# Patient Record
Sex: MALE | Race: WHITE | Employment: FULL TIME | URBAN - METROPOLITAN AREA
[De-identification: names, ages, dates, MRNs, and addresses within clinical notes are randomized per-mention and may not be internally consistent; named-entity substitution may affect disease eponyms.]

---

## 2022-03-08 ENCOUNTER — HOSPITAL ENCOUNTER (OUTPATIENT)
Dept: CT IMAGING | Age: 22
Discharge: HOME | End: 2022-03-10
Payer: COMMERCIAL

## 2022-03-08 ENCOUNTER — ANCILLARY ORDERS (OUTPATIENT)
Dept: PRIMARY CARE CLINIC | Age: 22
End: 2022-03-08

## 2022-03-08 ENCOUNTER — OFFICE VISIT (OUTPATIENT)
Dept: PRIMARY CARE CLINIC | Age: 22
End: 2022-03-08
Payer: COMMERCIAL

## 2022-03-08 VITALS
WEIGHT: 315 LBS | DIASTOLIC BLOOD PRESSURE: 84 MMHG | HEIGHT: 73 IN | HEART RATE: 104 BPM | OXYGEN SATURATION: 97 % | SYSTOLIC BLOOD PRESSURE: 128 MMHG | BODY MASS INDEX: 41.75 KG/M2

## 2022-03-08 DIAGNOSIS — E66.01 CLASS 3 SEVERE OBESITY WITHOUT SERIOUS COMORBIDITY WITH BODY MASS INDEX (BMI) OF 50.0 TO 59.9 IN ADULT, UNSPECIFIED OBESITY TYPE (HCC): ICD-10-CM

## 2022-03-08 DIAGNOSIS — R10.9 ABDOMINAL PAIN, UNSPECIFIED ABDOMINAL LOCATION: ICD-10-CM

## 2022-03-08 DIAGNOSIS — K76.0 FATTY LIVER: Primary | ICD-10-CM

## 2022-03-08 DIAGNOSIS — R10.12 LUQ PAIN: ICD-10-CM

## 2022-03-08 DIAGNOSIS — Z13.1 SCREENING FOR DIABETES MELLITUS: ICD-10-CM

## 2022-03-08 DIAGNOSIS — R10.12 LUQ PAIN: Primary | ICD-10-CM

## 2022-03-08 PROCEDURE — 99204 OFFICE O/P NEW MOD 45 MIN: CPT | Performed by: PHYSICIAN ASSISTANT

## 2022-03-08 PROCEDURE — 74176 CT ABD & PELVIS W/O CONTRAST: CPT

## 2022-03-08 PROCEDURE — 6360000004 HC RX CONTRAST MEDICATION: Performed by: PHYSICIAN ASSISTANT

## 2022-03-08 RX ORDER — SODIUM CHLORIDE 0.9 % (FLUSH) 0.9 %
10 SYRINGE (ML) INJECTION PRN
Status: DISCONTINUED | OUTPATIENT
Start: 2022-03-08 | End: 2022-03-08

## 2022-03-08 RX ORDER — 0.9 % SODIUM CHLORIDE 0.9 %
80 INTRAVENOUS SOLUTION INTRAVENOUS ONCE
Status: DISCONTINUED | OUTPATIENT
Start: 2022-03-08 | End: 2022-03-08

## 2022-03-08 RX ADMIN — IOHEXOL 50 ML: 240 INJECTION, SOLUTION INTRATHECAL; INTRAVASCULAR; INTRAVENOUS; ORAL at 15:49

## 2022-03-08 SDOH — ECONOMIC STABILITY: FOOD INSECURITY: WITHIN THE PAST 12 MONTHS, YOU WORRIED THAT YOUR FOOD WOULD RUN OUT BEFORE YOU GOT MONEY TO BUY MORE.: NEVER TRUE

## 2022-03-08 SDOH — ECONOMIC STABILITY: FOOD INSECURITY: WITHIN THE PAST 12 MONTHS, THE FOOD YOU BOUGHT JUST DIDN'T LAST AND YOU DIDN'T HAVE MONEY TO GET MORE.: NEVER TRUE

## 2022-03-08 ASSESSMENT — COLUMBIA-SUICIDE SEVERITY RATING SCALE - C-SSRS
2. HAVE YOU ACTUALLY HAD ANY THOUGHTS OF KILLING YOURSELF?: NO
6. HAVE YOU EVER DONE ANYTHING, STARTED TO DO ANYTHING, OR PREPARED TO DO ANYTHING TO END YOUR LIFE?: NO
1. WITHIN THE PAST MONTH, HAVE YOU WISHED YOU WERE DEAD OR WISHED YOU COULD GO TO SLEEP AND NOT WAKE UP?: NO

## 2022-03-08 ASSESSMENT — ENCOUNTER SYMPTOMS
PHOTOPHOBIA: 0
SORE THROAT: 0
RHINORRHEA: 0
VOMITING: 0
EYE DISCHARGE: 0
ABDOMINAL DISTENTION: 0
SINUS PRESSURE: 0
CONSTIPATION: 0
SHORTNESS OF BREATH: 0
CHEST TIGHTNESS: 0
COUGH: 0
DIARRHEA: 0
ABDOMINAL PAIN: 1

## 2022-03-08 ASSESSMENT — PATIENT HEALTH QUESTIONNAIRE - PHQ9
SUM OF ALL RESPONSES TO PHQ QUESTIONS 1-9: 0
SUM OF ALL RESPONSES TO PHQ9 QUESTIONS 1 & 2: 0
10. IF YOU CHECKED OFF ANY PROBLEMS, HOW DIFFICULT HAVE THESE PROBLEMS MADE IT FOR YOU TO DO YOUR WORK, TAKE CARE OF THINGS AT HOME, OR GET ALONG WITH OTHER PEOPLE: 0
1. LITTLE INTEREST OR PLEASURE IN DOING THINGS: 0
4. FEELING TIRED OR HAVING LITTLE ENERGY: 0
9. THOUGHTS THAT YOU WOULD BE BETTER OFF DEAD, OR OF HURTING YOURSELF: 0
SUM OF ALL RESPONSES TO PHQ QUESTIONS 1-9: 0
SUM OF ALL RESPONSES TO PHQ QUESTIONS 1-9: 0
2. FEELING DOWN, DEPRESSED OR HOPELESS: 0
6. FEELING BAD ABOUT YOURSELF - OR THAT YOU ARE A FAILURE OR HAVE LET YOURSELF OR YOUR FAMILY DOWN: 0
SUM OF ALL RESPONSES TO PHQ QUESTIONS 1-9: 0
7. TROUBLE CONCENTRATING ON THINGS, SUCH AS READING THE NEWSPAPER OR WATCHING TELEVISION: 0
3. TROUBLE FALLING OR STAYING ASLEEP: 0
5. POOR APPETITE OR OVEREATING: 0
8. MOVING OR SPEAKING SO SLOWLY THAT OTHER PEOPLE COULD HAVE NOTICED. OR THE OPPOSITE, BEING SO FIGETY OR RESTLESS THAT YOU HAVE BEEN MOVING AROUND A LOT MORE THAN USUAL: 0

## 2022-03-08 ASSESSMENT — SOCIAL DETERMINANTS OF HEALTH (SDOH): HOW HARD IS IT FOR YOU TO PAY FOR THE VERY BASICS LIKE FOOD, HOUSING, MEDICAL CARE, AND HEATING?: NOT HARD AT ALL

## 2022-03-08 NOTE — PROGRESS NOTES
[address] [place] [address] PRIMARY CARE  [address] [address]man [address] [address]  Dept: [phone]    Thyra Lundborg is a 20 y.o. male New patient, who presents today for his medical conditions/complaints as noted below. Chief Complaint   Patient presents with   [facility]    Abdominal Pain     LUQ abdominal pain about 4-5 days ago. No BM concerns        HPI:     HPI: The patient is here for LUQ bd pain. 4-5 days ago. It was tender then. Has happened 3-5 years ago. Did blood tests only found elevated liver enzymes. LUQ pain has gotten better. Moving makes it worse. Gets nauseated with the pain. No fevers and no change in bowel habits. Has not tried anything yet. Reviewed prior notes None  Reviewed previous Labs    No results found for: LDLCHOLESTEROL, LDLCALC    (goal LDL is <100)   No results found for: AST, ALT, BUN, LABA1C, TSH  BP Readings from Last 3 Encounters:   03/08/22 128/84          (goal 120/80)    Past Medical History:   Diagnosis Date    ADHD (attention deficit hyperactivity disorder)     Depressed       Past Surgical History:   Procedure Laterality Date    TONSILLECTOMY AND ADENOIDECTOMY         History reviewed. No pertinent family history. Social History     Tobacco Use    Smoking status: Light Tobacco Smoker     Packs/day: 0.25     Types: Cigarettes     Start date: 2013    Smokeless tobacco: Current User     Types: Chew   Substance Use Topics    Alcohol use: Yes     Comment: social      No current outpatient medications on file. No current facility-administered medications for this visit.      No Known Allergies    Health Maintenance   Topic Date Due    Hepatitis C screen  Never done    Varicella vaccine (1 of 2 - 2-dose childhood series) Never done    COVID-19 Vaccine (1) Never done    HPV vaccine (1 - Male 2-dose series) Never done    Depression Screen  Never done    HIV screen  Never done    DTaP/Tdap/Td vaccine (1 - Tdap) Never done  Flu vaccine (1) Never done    Hepatitis A vaccine  Aged Out    Hepatitis B vaccine  Aged Out    Hib vaccine  Aged Out    Meningococcal (ACWY) vaccine  Aged Out    Pneumococcal 0-64 years Vaccine  Aged Out       Subjective:      Review of Systems   Constitutional: Negative for chills, fever and unexpected weight change. HENT: Negative for congestion, hearing loss, rhinorrhea, sinus pressure and sore throat. Eyes: Negative for photophobia, discharge and visual disturbance. Respiratory: Negative for cough, chest tightness and shortness of breath. Cardiovascular: Negative for chest pain, palpitations and leg swelling. Gastrointestinal: Positive for abdominal pain (LUQ). Negative for abdominal distention, constipation, diarrhea and vomiting. Endocrine: Negative for polydipsia and polyuria. Genitourinary: Negative for decreased urine volume, difficulty urinating, frequency and urgency. Musculoskeletal: Negative for arthralgias, gait problem and myalgias. Skin: Negative for rash. Allergic/Immunologic: Negative for food allergies. Neurological: Negative for dizziness, weakness, numbness and headaches. Hematological: Negative for adenopathy. Psychiatric/Behavioral: Positive for dysphoric mood. Negative for sleep disturbance. The patient is nervous/anxious (smokes and that helps). Objective:     /84   Pulse 104   Ht 6' 1\" (1.854 m)   Wt (!) 381 lb 9.6 oz (173.1 kg)   SpO2 97%   BMI 50.35 kg/m²   Physical Exam  Constitutional:       General: He is not in acute distress. Appearance: Normal appearance. He is not ill-appearing. HENT:      Head: Normocephalic and atraumatic. Right Ear: External ear normal.      Left Ear: External ear normal.      Nose: Nose normal.      Mouth/Throat:      Mouth: Mucous membranes are moist.   Eyes:      Extraocular Movements: Extraocular movements intact.       Conjunctiva/sclera: Conjunctivae normal.      Pupils: Pupils are equal, round, and reactive to light. Neck:      Vascular: No carotid bruit. Cardiovascular:      Rate and Rhythm: Normal rate and regular rhythm. Pulses: Normal pulses. Heart sounds: Normal heart sounds. Pulmonary:      Effort: Pulmonary effort is normal. No respiratory distress. Breath sounds: Normal breath sounds. Abdominal:      General: Bowel sounds are normal. There is no distension. Tenderness: There is abdominal tenderness (diffuse). There is guarding. Musculoskeletal:         General: Normal range of motion. Cervical back: Normal range of motion and neck supple. Lymphadenopathy:      Cervical: No cervical adenopathy. Skin:     General: Skin is warm and dry. Neurological:      General: No focal deficit present. Mental Status: He is alert and oriented to person, place, and time. Psychiatric:         Mood and Affect: Mood normal.         Behavior: Behavior normal.         Thought Content: Thought content normal.         Assessment and Plan:          1. LUQ pain  -     US ABDOMEN LIMITED; Future  -     CT ABDOMEN PELVIS W IV CONTRAST Additional Contrast? Radiologist Recommendation; Future  2. Abdominal pain, unspecified abdominal location  -     US ABDOMEN LIMITED; Future  -     CT ABDOMEN PELVIS W IV CONTRAST Additional Contrast? Radiologist Recommendation; Future  3. Screening for diabetes mellitus  -     CBC with Auto Differential; Future  -     Comprehensive Metabolic Panel; Future  -     Lipid, Fasting; Future  -     Hemoglobin A1C; Future  -     TSH With Reflex Ft4; Future  4. Class 3 severe obesity without serious comorbidity with body mass index (BMI) of 50.0 to 59.9 in adult, unspecified obesity type ([address]  -     CT ABDOMEN PELVIS W IV CONTRAST Additional Contrast? Radiologist Recommendation; Future             Patient given educational materials - see patient instructions. Discussed use, benefit, and side effects of prescribed medications.   All patient questions answered. Pt voiced understanding. Reviewed health maintenance. Instructed to continue current medications, diet and exercise. Patient agreed with treatment plan. Follow up as directed.      Electronically signed by DIGNA Ruiz on 3/8/2022 at 8:51 AM

## 2022-03-09 ENCOUNTER — TELEPHONE (OUTPATIENT)
Dept: PRIMARY CARE CLINIC | Age: 22
End: 2022-03-09

## 2022-03-09 NOTE — TELEPHONE ENCOUNTER
----- Message from Shania Olivo sent at 3/9/2022  8:07 AM EST -----  Subject: Results Request    QUESTIONS  Which lab or imaging result is the patient calling about? CT Scan Abdomen  Which provider ordered the test?   At what location was the test performed? Date the test was performed? 2022-03-08  Additional Information for Provider?   ---------------------------------------------------------------------------  --------------  CALL BACK INFO  What is the best way for the office to contact you? OK to leave message on   voicemail  Preferred Call Back Phone Number?  [phone]

## 2022-03-09 NOTE — RESULT ENCOUNTER NOTE
Call and advise patient that the results showed no obvious cause of pain. Hepatomegaly, splenomegaly, diverticulosis, and degenerative changes of spine were noted.

## 2022-03-16 ENCOUNTER — TELEPHONE (OUTPATIENT)
Dept: GASTROENTEROLOGY | Age: 22
End: 2022-03-16

## 2022-03-16 NOTE — TELEPHONE ENCOUNTER
NP / LUQ pain - Abdominal pain, unspecified abdominal location (ICD-10-CM) - Class 3 severe obesity without serious comorbidity with body mass index (BMI) of 50.0 to 59.9 in adult, unspecified obesity type ([facility] [address] - Fatty liver / bcbs

## 2022-04-05 NOTE — TELEPHONE ENCOUNTER
Called patient to schedule and mailbox is still full. Third attempt and sending back to the referring provider.

## 2022-07-06 ENCOUNTER — HOSPITAL ENCOUNTER (EMERGENCY)
Age: 22
Discharge: HOME | End: 2022-07-06
Attending: EMERGENCY MEDICINE
Payer: COMMERCIAL

## 2022-07-06 ENCOUNTER — APPOINTMENT (OUTPATIENT)
Dept: ULTRASOUND IMAGING | Age: 22
End: 2022-07-06
Payer: COMMERCIAL

## 2022-07-06 VITALS
OXYGEN SATURATION: 99 % | TEMPERATURE: 98 F | WEIGHT: 315 LBS | HEART RATE: 87 BPM | RESPIRATION RATE: 16 BRPM | HEIGHT: 73 IN | SYSTOLIC BLOOD PRESSURE: 157 MMHG | DIASTOLIC BLOOD PRESSURE: 89 MMHG | BODY MASS INDEX: 41.75 KG/M2

## 2022-07-06 DIAGNOSIS — R10.11 RIGHT UPPER QUADRANT ABDOMINAL PAIN: Primary | ICD-10-CM

## 2022-07-06 LAB
ABSOLUTE EOS #: 0.4 K/UL (ref 0–0.4)
ABSOLUTE LYMPH #: 3.3 K/UL (ref 1–4.8)
ABSOLUTE MONO #: 0.9 K/UL (ref 0.1–1.2)
ALBUMIN SERPL-MCNC: 5 G/DL (ref 3.5–5.2)
ALBUMIN/GLOBULIN RATIO: 1.6 (ref 1–2.5)
ALP BLD-CCNC: 94 U/L (ref 40–129)
ALT SERPL-CCNC: 77 U/L (ref 5–41)
ANION GAP SERPL CALCULATED.3IONS-SCNC: 12 MMOL/L (ref 9–17)
AST SERPL-CCNC: 38 U/L
BASOPHILS # BLD: 1 % (ref 0–2)
BASOPHILS ABSOLUTE: 0.1 K/UL (ref 0–0.2)
BILIRUB SERPL-MCNC: 0.81 MG/DL (ref 0.3–1.2)
BILIRUBIN DIRECT: 0.15 MG/DL
BILIRUBIN, INDIRECT: 0.66 MG/DL (ref 0–1)
BUN BLDV-MCNC: 9 MG/DL (ref 6–20)
CALCIUM SERPL-MCNC: 10.1 MG/DL (ref 8.6–10.4)
CHLORIDE BLD-SCNC: 101 MMOL/L (ref 98–107)
CO2: 26 MMOL/L (ref 20–31)
CREAT SERPL-MCNC: 0.63 MG/DL (ref 0.7–1.2)
EOSINOPHILS RELATIVE PERCENT: 3 % (ref 1–4)
GFR AFRICAN AMERICAN: >60 ML/MIN
GFR NON-AFRICAN AMERICAN: >60 ML/MIN
GFR SERPL CREATININE-BSD FRML MDRD: ABNORMAL ML/MIN/{1.73_M2}
GLUCOSE BLD-MCNC: 95 MG/DL (ref 70–99)
HCT VFR BLD CALC: 45.2 % (ref 41–53)
HEMOGLOBIN: 15.5 G/DL (ref 13.5–17.5)
LIPASE: 29 U/L (ref 13–60)
LYMPHOCYTES # BLD: 24 % (ref 24–44)
MCH RBC QN AUTO: 26.8 PG (ref 26–34)
MCHC RBC AUTO-ENTMCNC: 34.4 G/DL (ref 31–37)
MCV RBC AUTO: 77.9 FL (ref 80–100)
MONOCYTES # BLD: 7 % (ref 2–11)
PDW BLD-RTO: 14.2 % (ref 12.5–15.4)
PLATELET # BLD: 305 K/UL (ref 140–450)
PMV BLD AUTO: 8.1 FL (ref 6–12)
POTASSIUM SERPL-SCNC: 4.4 MMOL/L (ref 3.7–5.3)
RBC # BLD: 5.8 M/UL (ref 4.5–5.9)
SEG NEUTROPHILS: 65 % (ref 36–66)
SEGMENTED NEUTROPHILS ABSOLUTE COUNT: 9.1 K/UL (ref 1.8–7.7)
SODIUM BLD-SCNC: 139 MMOL/L (ref 135–144)
TOTAL PROTEIN: 8.1 G/DL (ref 6.4–8.3)
WBC # BLD: 13.9 K/UL (ref 3.5–11)

## 2022-07-06 PROCEDURE — 80048 BASIC METABOLIC PNL TOTAL CA: CPT

## 2022-07-06 PROCEDURE — 96374 THER/PROPH/DIAG INJ IV PUSH: CPT

## 2022-07-06 PROCEDURE — 2580000003 HC RX 258: Performed by: EMERGENCY MEDICINE

## 2022-07-06 PROCEDURE — 85025 COMPLETE CBC W/AUTO DIFF WBC: CPT

## 2022-07-06 PROCEDURE — 6360000002 HC RX W HCPCS: Performed by: EMERGENCY MEDICINE

## 2022-07-06 PROCEDURE — 80076 HEPATIC FUNCTION PANEL: CPT

## 2022-07-06 PROCEDURE — 83690 ASSAY OF LIPASE: CPT

## 2022-07-06 PROCEDURE — 99284 EMERGENCY DEPT VISIT MOD MDM: CPT

## 2022-07-06 PROCEDURE — 36415 COLL VENOUS BLD VENIPUNCTURE: CPT

## 2022-07-06 PROCEDURE — 76705 ECHO EXAM OF ABDOMEN: CPT

## 2022-07-06 RX ORDER — 0.9 % SODIUM CHLORIDE 0.9 %
1000 INTRAVENOUS SOLUTION INTRAVENOUS ONCE
Status: COMPLETED | OUTPATIENT
Start: 2022-07-06 | End: 2022-07-06

## 2022-07-06 RX ORDER — HYDROCODONE BITARTRATE AND ACETAMINOPHEN 5; 325 MG/1; MG/1
1 TABLET ORAL EVERY 6 HOURS PRN
Qty: 10 TABLET | Refills: 0 | Status: SHIPPED | OUTPATIENT
Start: 2022-07-06 | End: 2022-07-09

## 2022-07-06 RX ORDER — IBUPROFEN 600 MG/1
600 TABLET ORAL EVERY 6 HOURS PRN
Qty: 30 TABLET | Refills: 0 | Status: ON HOLD | OUTPATIENT
Start: 2022-07-06 | End: 2022-07-19 | Stop reason: ALTCHOICE

## 2022-07-06 RX ORDER — KETOROLAC TROMETHAMINE 15 MG/ML
15 INJECTION, SOLUTION INTRAMUSCULAR; INTRAVENOUS ONCE
Status: COMPLETED | OUTPATIENT
Start: 2022-07-06 | End: 2022-07-06

## 2022-07-06 RX ORDER — ESOMEPRAZOLE MAGNESIUM 40 MG/1
40 CAPSULE, DELAYED RELEASE ORAL
Qty: 30 CAPSULE | Refills: 0 | Status: ON HOLD | OUTPATIENT
Start: 2022-07-06 | End: 2022-07-19 | Stop reason: ALTCHOICE

## 2022-07-06 RX ADMIN — KETOROLAC TROMETHAMINE 15 MG: 15 INJECTION, SOLUTION INTRAMUSCULAR; INTRAVENOUS at 14:04

## 2022-07-06 RX ADMIN — SODIUM CHLORIDE 1000 ML: 9 INJECTION, SOLUTION INTRAVENOUS at 13:53

## 2022-07-06 ASSESSMENT — PAIN DESCRIPTION - DESCRIPTORS: DESCRIPTORS: SHARP;STABBING

## 2022-07-06 ASSESSMENT — ENCOUNTER SYMPTOMS: ABDOMINAL PAIN: 1

## 2022-07-06 ASSESSMENT — PAIN DESCRIPTION - ORIENTATION: ORIENTATION: RIGHT;UPPER

## 2022-07-06 ASSESSMENT — PAIN SCALES - GENERAL
PAINLEVEL_OUTOF10: 5
PAINLEVEL_OUTOF10: 3

## 2022-07-06 ASSESSMENT — PAIN DESCRIPTION - LOCATION: LOCATION: ABDOMEN

## 2022-07-06 ASSESSMENT — PAIN DESCRIPTION - PAIN TYPE: TYPE: ACUTE PAIN

## 2022-07-06 ASSESSMENT — PAIN DESCRIPTION - FREQUENCY: FREQUENCY: CONTINUOUS

## 2022-07-06 NOTE — ED PROVIDER NOTES
[address] [facility]  [address] [facility] [address] [facility] [address]  Phone: [phone]  Fax: [phone]        Pt Name: Prashant Merchant  MRN: [id number]  [place] 2000  Date of evaluation: 22      CHIEF COMPLAINT     Chief Complaint   Patient presents with    Abdominal Pain     RUQ woke him at 0200         HISTORY OF PRESENT ILLNESS  (Location/Symptom, Timing/Onset, Context/Setting, Quality, Duration, Modifying Factors, Severity.)    Prashant Merchant is a 20 y.o. male who presents with abdominal pain. The patient states that for 4 years he has had intermittent episodes of right upper quadrant abdominal pain he states his family doctor did get an MRI of his abdomen and suggested he had gallbladder issues however he has never followed up with a surgeon no fever no chills no nausea vomiting or diarrhea no blood in the urine or stool no chest pain no shortness of breath nothing he does makes his symptoms better or worse the patient dates they have been intermittent for the last 4 years and this episode started last night after eating rice      REVIEW OF SYSTEMS    (2-9 systems for level 4, 10 or more for level 5)     Review of Systems   Gastrointestinal: Positive for abdominal pain. All other systems reviewed and are negative. PAST MEDICAL HISTORY    has a past medical history of ADHD (attention deficit hyperactivity disorder) and Depressed. SURGICAL HISTORY      has a past surgical history that includes Tonsillectomy and adenoidectomy. CURRENTMEDICATIONS       Previous Medications    No medications on file       ALLERGIES     has No Known Allergies. FAMILY HISTORY     He indicated that his mother is alive. He indicated that his father is . family history is not on file. SOCIAL HISTORY      reports that he has been smoking cigarettes and e-cigarettes. He started smoking about 9 years ago. He has been smoking about 0.25 packs per day.  He has quit using smokeless tobacco.  His smokeless tobacco use included chew. He reports previous alcohol use. He reports previous drug use. Drug: Marijuana [place]). PHYSICAL EXAM    (up to 7 for level 4, 8 or more for level 5)   INITIAL VITALS:  height is 6' 1\" (1.854 m) and weight is 174.6 kg (385 lb) (abnormal). His oral temperature is 98 °F (36.7 °C). His blood pressure is 157/89 (abnormal) and his pulse is 87. His respiration is 16 and oxygen saturation is 99%. Physical Exam  Vitals and nursing note reviewed. Constitutional:       Appearance: Normal appearance. HENT:      Head: Normocephalic and atraumatic. Eyes:      Conjunctiva/sclera: Conjunctivae normal.   Cardiovascular:      Rate and Rhythm: Normal rate and regular rhythm. Pulmonary:      Effort: Pulmonary effort is normal.      Breath sounds: Normal breath sounds. Abdominal:      General: Bowel sounds are normal. There is no distension. Palpations: Abdomen is soft. There is no mass. Tenderness: There is abdominal tenderness. There is guarding. There is no right CVA tenderness, left CVA tenderness or rebound. Comments: Tenderness with some voluntary guarding in the right upper quadrant only no McBurney's area tenderness no other abdominal tenderness   Musculoskeletal:         General: Normal range of motion. Cervical back: Normal range of motion and neck supple. Skin:     General: Skin is warm and dry. Neurological:      General: No focal deficit present. Mental Status: He is alert.       Comments: GCS of 15 with no focal deficits appreciated         DIFFERENTIAL DIAGNOSIS/ MDM:     We will establish an IV give the patient IV fluids and Toradol I will check labs UA and an ultrasound of the gallbladder    DIAGNOSTIC RESULTS         RADIOLOGY:    EXAMINATION:   CT OF THE ABDOMEN AND PELVIS WITHOUT CONTRAST 3/8/2022 11:40 am       TECHNIQUE:   CT of the abdomen and pelvis was performed without the administration of   intravenous contrast. Multiplanar reformatted images are provided for review. Dose modulation, iterative reconstruction, and/or weight based adjustment of   the mA/kV was utilized to reduce the radiation dose to as low as reasonably   achievable.       COMPARISON:   None.       HISTORY:   ORDERING SYSTEM PROVIDED HISTORY: LUQ pain   TECHNOLOGIST PROVIDED HISTORY:       Reason for Exam: LUQ abdominal pain occasionally RUQ as well. x 4-5 days   Additional signs and symptoms: sts had this before 3-5 years ago.       FINDINGS:   Examination was performed without intravenous contrast due to patient   refusal. Ama Sack did agree to enteric contrast administration.       Lower Chest: Lung bases are clear.  No pleural or pericardial effusion.       Organs: Mild hepatomegaly spanning just over 19 cm craniocaudal with marked   steatosis.  Regions of focal fatty sparing near the gallbladder fossa.  Mild   splenomegaly spanning just over 16 cm craniocaudal.  Incidental small   splenule along the inferolateral splenic margin.  Unenhanced pancreas and   adrenal glands are grossly unremarkable.  No nephrolithiasis, hydronephrosis,   or perinephric stranding.  Ureters are normal caliber.       GI/Bowel: Normal appendix.  No bowel obstruction.  Mild colonic   diverticulosis without diverticulitis.       Pelvis: Urinary bladder and male pelvic organs are grossly unremarkable.       Peritoneum/Retroperitoneum: No free fluid, free air, or lymphadenopathy. Normal caliber aorta.       Bones/Soft Tissues: No acute abnormality in the imaged portion of the soft   tissues.  Peripheral portions of the soft tissues are excluded from the field   of view due to body habitus, more significantly on the right.  Degenerative   changes and multilevel Schmorl's nodes in the lower thoracic spine.  Mild   lower lumbar degenerative changes.  Background baseline congenital spinal   canal stenosis.            Impression   Hepatomegaly with steatosis and regions of focal fatty sparing.       Mild splenomegaly.       Mild colonic diverticulosis without diverticulitis.       Normal appendix.       Mild degenerative changes of the spine superimposed on baseline congenital   spinal canal stenosis. Interpretation per the Radiologist below, if available at the time of this note:    [address] (Final result)  Result time 07/06/22 13:36:55  Final result by Iliana Alonzo MD (07/06/22 13:36:55)                Impression:    1. Fatty liver with focal fatty sparing.  Hepatomegaly. 2. Mildly dilated common bile duct measuring 6 mm.  Consider further   evaluation with outpatient MRCP.  Correlate with LFTs. 3. Nonvisualization of the pancreas. Narrative:    EXAMINATION:   RIGHT UPPER QUADRANT ULTRASOUND     7/6/2022 12:55 pm     COMPARISON:   CT abdomen pelvis from 03/08/2022     HISTORY:   ORDERING SYSTEM PROVIDED HISTORY: Pain   TECHNOLOGIST PROVIDED HISTORY:   Pain     59-year-old male with right upper quadrant abdominal ultrasound     FINDINGS:   Exam is limited as patient was unable to handle for old fracture as well as   patient's body habitus. LIVER: Diffuse fatty infiltration of the liver with focal fatty sparing.  No   intrahepatic biliary ductal dilation.  Hepatomegaly.  Liver length measures   17.9 cm. BILIARY SYSTEM:  Gallbladder is unremarkable without evidence of   pericholecystic fluid, wall thickening or stones.  Negative sonographic   Nolan's sign.  Gallbladder wall thickness measures 3 mm. Common bile duct is mildly dilated measuring 6 mm. RIGHT KIDNEY: Right kidney measures 12.9 x 5.9 x 5.6 cm.  Right renal   cortical thickness measures 2.1 cm.  No gross right-sided hydronephrosis. PANCREAS:  Nonvisualization of the pancreas. OTHER: No evidence of right upper quadrant ascites.                    LABS:  Results for orders placed or performed during the hospital encounter of 01/49/11   Basic Metabolic Panel   Result Value Ref Range    Glucose 95 70 - 99 mg/dL    BUN 9 6 - 20 mg/dL    CREATININE 0.63 (L) 0.70 - 1.20 mg/dL    Calcium 10.1 8.6 - 10.4 mg/dL    Sodium 139 135 - 144 mmol/L    Potassium 4.4 3.7 - 5.3 mmol/L    Chloride 101 98 - 107 mmol/L    CO2 26 20 - 31 mmol/L    Anion Gap 12 9 - 17 mmol/L    GFR Non-African American >60 >60 mL/min    GFR African American >60 >60 mL/min    GFR Comment         CBC with Auto Differential   Result Value Ref Range    WBC 13.9 (H) 3.5 - 11.0 k/uL    RBC 5.80 4.5 - 5.9 m/uL    Hemoglobin 15.5 13.5 - 17.5 g/dL    Hematocrit 45.2 41 - 53 %    MCV 77.9 (L) 80 - 100 fL    MCH 26.8 26 - 34 pg    MCHC 34.4 31 - 37 g/dL    RDW 14.2 12.5 - 15.4 %    Platelets 645 995 - 357 k/uL    MPV 8.1 6.0 - 12.0 fL    Seg Neutrophils 65 36 - 66 %    Lymphocytes 24 24 - 44 %    Monocytes 7 2 - 11 %    Eosinophils % 3 1 - 4 %    Basophils 1 0 - 2 %    Segs Absolute 9.10 (H) 1.8 - 7.7 k/uL    Absolute Lymph # 3.30 1.0 - 4.8 k/uL    Absolute Mono # 0.90 0.1 - 1.2 k/uL    Absolute Eos # 0.40 0.0 - 0.4 k/uL    Basophils Absolute 0.10 0.0 - 0.2 k/uL   Hepatic Function Panel   Result Value Ref Range    Albumin 5.0 3.5 - 5.2 g/dL    Alkaline Phosphatase 94 40 - 129 U/L    ALT 77 (H) 5 - 41 U/L    AST 38 <40 U/L    Total Bilirubin 0.81 0.3 - 1.2 mg/dL    Bilirubin, Direct 0.15 <0.31 mg/dL    Bilirubin, Indirect 0.66 0.00 - 1.00 mg/dL    Total Protein 8.1 6.4 - 8.3 g/dL    Albumin/Globulin Ratio 1.6 1.0 - 2.5   Lipase   Result Value Ref Range    Lipase 29 13 - 60 U/L           EMERGENCY DEPARTMENT COURSE:   Vitals:    Vitals:    07/06/22 1244   BP: (!) 157/89   Pulse: 87   Resp: 16   Temp: 98 °F (36.7 °C)   TempSrc: Oral   SpO2: 99%   Weight: (!) 174.6 kg (385 lb)   Height: 6' 1\" (1.854 m)     -------------------------  BP: (!) 157/89, Temp: 98 °F (36.7 °C), Heart Rate: 87, Resp: 16      RE-EVALUATION:  The patient presents with 4 years of right upper quadrant abdominal pain CT showed a fatty liver no signs of appendicitis when this was worked up previously ultrasound did show a mildly dilated common bile duct measuring 6 mm LFTs are unremarkable patient was noted to have an elevated white blood cell count he has no peritoneal findings given this I do feel he can attempt to work this as an outpatient I will write prescriptions for Motrin Norco and Nexium I am recommending that he contact his family doctor or surgery for follow-up within the next few days he is to avoid fatty foods and spicy foods return to the ER for increasing pain fever vomiting or other concerns    PROCEDURES:  None    FINAL IMPRESSION      1. Right upper quadrant abdominal pain          DISPOSITION/PLAN   DISPOSITION        CONDITION ON DISPOSITION:   Stable    PATIENT REFERRED TO:  Ronda Rodriguez IV, DO  [address] [facility] [address] [phone]    Call in 1 day        DISCHARGE MEDICATIONS:  New Prescriptions    ESOMEPRAZOLE (NEXIUM) 40 MG DELAYED RELEASE CAPSULE    Take 1 capsule by mouth every morning (before breakfast)    HYDROCODONE-ACETAMINOPHEN (NORCO) 5-325 MG PER TABLET    Take 1 tablet by mouth every 6 hours as needed for Pain for up to 3 days. Intended supply: 3 days. Take lowest dose possible to manage pain    IBUPROFEN (ADVIL;MOTRIN) 600 MG TABLET    Take 1 tablet by mouth every 6 hours as needed for Pain       (Please note that portions of this note were completed with a voicerecognition program.  Efforts were made to edit the dictations but occasionally words are mis-transcribed.)    Jennifer Vega MD,, MD, F.A.C.E.P.   Attending Emergency Medicine Physician       Jennifer Vega MD  07/06/22 [id number]

## 2022-07-14 ENCOUNTER — ANESTHESIA EVENT (OUTPATIENT)
Dept: OPERATING ROOM | Age: 22
End: 2022-07-14
Payer: COMMERCIAL

## 2022-07-14 NOTE — PROGRESS NOTES
Preoperative Instructions:    Stop eating solid foods at midnight the night prior to your surgery. Stop drinking clear liquids at midnight the night prior to your surgery. Arrive at the surgery center (3rd entrance) on _5-52-01______________ by _____1230__________. Please stop any blood thinning medications as directed by your surgeon or prescribing physician. Failure to stop certain medications may interfere with your scheduled surgery. These may include: Aspirin, Coumadin, Plavix, NSAIDS (Motrin, Aleve, Advil, Mobic, Celebrex), Eliquis, Pradaxa, Xarelto, Fish oil, and herbal supplements. You may continue the rest of your medications through the night before surgery unless instructed otherwise. Day of surgery please take only the following medication(s) with a small sip of water:None      Please shower with antibacterial or Hibiclens soap the night before and the morning of surgery. .      Reminders:  -If you are going home the day of your procedure, you will need a family member or friend to stay during the procedure and drive you home after your procedure. Your  must be 25years of age or older and able to sign off on your discharge instructions.    -If you are going home the same day of your surgery, someone must remain with you for the first 24 hours after your surgery if you receive sedation or anesthesia.      -Please do not wear any jeweler, lotions, contacts or body piercing the day of surgery

## 2022-07-19 ENCOUNTER — HOSPITAL ENCOUNTER (OUTPATIENT)
Age: 22
Setting detail: OBSERVATION
Discharge: HOME | End: 2022-07-22
Attending: SURGERY | Admitting: SURGERY
Payer: COMMERCIAL

## 2022-07-19 ENCOUNTER — ANESTHESIA (OUTPATIENT)
Dept: OPERATING ROOM | Age: 22
End: 2022-07-19
Payer: COMMERCIAL

## 2022-07-19 DIAGNOSIS — K80.50 BILIARY COLIC: ICD-10-CM

## 2022-07-19 DIAGNOSIS — G89.18 POST-OP PAIN: Primary | ICD-10-CM

## 2022-07-19 DIAGNOSIS — R10.11 RIGHT UPPER QUADRANT PAIN: ICD-10-CM

## 2022-07-19 PROBLEM — Z90.49 STATUS POST CHOLECYSTECTOMY: Status: ACTIVE | Noted: 2022-07-19

## 2022-07-19 PROCEDURE — 6370000000 HC RX 637 (ALT 250 FOR IP): Performed by: STUDENT IN AN ORGANIZED HEALTH CARE EDUCATION/TRAINING PROGRAM

## 2022-07-19 PROCEDURE — 96372 THER/PROPH/DIAG INJ SC/IM: CPT

## 2022-07-19 PROCEDURE — 7100000000 HC PACU RECOVERY - FIRST 15 MIN: Performed by: SURGERY

## 2022-07-19 PROCEDURE — 6360000002 HC RX W HCPCS

## 2022-07-19 PROCEDURE — C1760 CLOSURE DEV, VASC: HCPCS | Performed by: SURGERY

## 2022-07-19 PROCEDURE — 3600000019 HC SURGERY ROBOT ADDTL 15MIN: Performed by: SURGERY

## 2022-07-19 PROCEDURE — S2900 ROBOTIC SURGICAL SYSTEM: HCPCS | Performed by: SURGERY

## 2022-07-19 PROCEDURE — 2720000010 HC SURG SUPPLY STERILE: Performed by: SURGERY

## 2022-07-19 PROCEDURE — 7100000001 HC PACU RECOVERY - ADDTL 15 MIN: Performed by: SURGERY

## 2022-07-19 PROCEDURE — 2580000003 HC RX 258: Performed by: ANESTHESIOLOGY

## 2022-07-19 PROCEDURE — 2709999900 HC NON-CHARGEABLE SUPPLY: Performed by: SURGERY

## 2022-07-19 PROCEDURE — 3700000000 HC ANESTHESIA ATTENDED CARE: Performed by: SURGERY

## 2022-07-19 PROCEDURE — 2580000003 HC RX 258: Performed by: STUDENT IN AN ORGANIZED HEALTH CARE EDUCATION/TRAINING PROGRAM

## 2022-07-19 PROCEDURE — 6360000002 HC RX W HCPCS: Performed by: ANESTHESIOLOGY

## 2022-07-19 PROCEDURE — 6360000002 HC RX W HCPCS: Performed by: STUDENT IN AN ORGANIZED HEALTH CARE EDUCATION/TRAINING PROGRAM

## 2022-07-19 PROCEDURE — G0378 HOSPITAL OBSERVATION PER HR: HCPCS

## 2022-07-19 PROCEDURE — 3600000009 HC SURGERY ROBOT BASE: Performed by: SURGERY

## 2022-07-19 PROCEDURE — 6370000000 HC RX 637 (ALT 250 FOR IP): Performed by: SURGERY

## 2022-07-19 PROCEDURE — 2500000003 HC RX 250 WO HCPCS: Performed by: NURSE ANESTHETIST, CERTIFIED REGISTERED

## 2022-07-19 PROCEDURE — 6360000002 HC RX W HCPCS: Performed by: NURSE ANESTHETIST, CERTIFIED REGISTERED

## 2022-07-19 PROCEDURE — 64488 TAP BLOCK BI INJECTION: CPT | Performed by: ANESTHESIOLOGY

## 2022-07-19 PROCEDURE — 6360000002 HC RX W HCPCS: Performed by: SURGERY

## 2022-07-19 PROCEDURE — 88304 TISSUE EXAM BY PATHOLOGIST: CPT

## 2022-07-19 PROCEDURE — 2580000003 HC RX 258: Performed by: SURGERY

## 2022-07-19 PROCEDURE — 2500000003 HC RX 250 WO HCPCS: Performed by: SURGERY

## 2022-07-19 PROCEDURE — 2500000003 HC RX 250 WO HCPCS: Performed by: ANESTHESIOLOGY

## 2022-07-19 PROCEDURE — 3700000001 HC ADD 15 MINUTES (ANESTHESIA): Performed by: SURGERY

## 2022-07-19 RX ORDER — SODIUM CHLORIDE 0.9 % (FLUSH) 0.9 %
5-40 SYRINGE (ML) INJECTION PRN
Status: DISCONTINUED | OUTPATIENT
Start: 2022-07-19 | End: 2022-07-19 | Stop reason: HOSPADM

## 2022-07-19 RX ORDER — INDOCYANINE GREEN AND WATER 25 MG
5 KIT INJECTION ONCE
Status: COMPLETED | OUTPATIENT
Start: 2022-07-19 | End: 2022-07-19

## 2022-07-19 RX ORDER — PROMETHAZINE HYDROCHLORIDE 25 MG/ML
6.25 INJECTION, SOLUTION INTRAMUSCULAR; INTRAVENOUS EVERY 5 MIN PRN
Status: DISCONTINUED | OUTPATIENT
Start: 2022-07-19 | End: 2022-07-19 | Stop reason: HOSPADM

## 2022-07-19 RX ORDER — MEPERIDINE HYDROCHLORIDE 50 MG/ML
12.5 INJECTION INTRAMUSCULAR; INTRAVENOUS; SUBCUTANEOUS EVERY 5 MIN PRN
Status: DISCONTINUED | OUTPATIENT
Start: 2022-07-19 | End: 2022-07-19 | Stop reason: HOSPADM

## 2022-07-19 RX ORDER — KETOROLAC TROMETHAMINE 30 MG/ML
INJECTION, SOLUTION INTRAMUSCULAR; INTRAVENOUS PRN
Status: DISCONTINUED | OUTPATIENT
Start: 2022-07-19 | End: 2022-07-19 | Stop reason: SDUPTHER

## 2022-07-19 RX ORDER — PROMETHAZINE HYDROCHLORIDE 25 MG/ML
INJECTION, SOLUTION INTRAMUSCULAR; INTRAVENOUS
Status: COMPLETED
Start: 2022-07-19 | End: 2022-07-19

## 2022-07-19 RX ORDER — FENTANYL CITRATE 50 UG/ML
INJECTION, SOLUTION INTRAMUSCULAR; INTRAVENOUS
Status: COMPLETED
Start: 2022-07-19 | End: 2022-07-19

## 2022-07-19 RX ORDER — FENTANYL CITRATE 50 UG/ML
INJECTION, SOLUTION INTRAMUSCULAR; INTRAVENOUS PRN
Status: DISCONTINUED | OUTPATIENT
Start: 2022-07-19 | End: 2022-07-19 | Stop reason: SDUPTHER

## 2022-07-19 RX ORDER — DIPHENHYDRAMINE HYDROCHLORIDE 50 MG/ML
12.5 INJECTION INTRAMUSCULAR; INTRAVENOUS
Status: DISCONTINUED | OUTPATIENT
Start: 2022-07-19 | End: 2022-07-19 | Stop reason: HOSPADM

## 2022-07-19 RX ORDER — OXYCODONE HYDROCHLORIDE 5 MG/1
5 TABLET ORAL EVERY 6 HOURS PRN
Status: DISCONTINUED | OUTPATIENT
Start: 2022-07-19 | End: 2022-07-22

## 2022-07-19 RX ORDER — SODIUM CHLORIDE 9 MG/ML
INJECTION, SOLUTION INTRAVENOUS PRN
Status: DISCONTINUED | OUTPATIENT
Start: 2022-07-19 | End: 2022-07-22 | Stop reason: HOSPADM

## 2022-07-19 RX ORDER — IPRATROPIUM BROMIDE AND ALBUTEROL SULFATE 2.5; .5 MG/3ML; MG/3ML
1 SOLUTION RESPIRATORY (INHALATION)
Status: DISCONTINUED | OUTPATIENT
Start: 2022-07-19 | End: 2022-07-19 | Stop reason: HOSPADM

## 2022-07-19 RX ORDER — ROCURONIUM BROMIDE 10 MG/ML
INJECTION, SOLUTION INTRAVENOUS PRN
Status: DISCONTINUED | OUTPATIENT
Start: 2022-07-19 | End: 2022-07-19 | Stop reason: SDUPTHER

## 2022-07-19 RX ORDER — LIDOCAINE HYDROCHLORIDE 10 MG/ML
INJECTION, SOLUTION EPIDURAL; INFILTRATION; INTRACAUDAL; PERINEURAL PRN
Status: DISCONTINUED | OUTPATIENT
Start: 2022-07-19 | End: 2022-07-19 | Stop reason: SDUPTHER

## 2022-07-19 RX ORDER — LABETALOL HYDROCHLORIDE 5 MG/ML
10 INJECTION, SOLUTION INTRAVENOUS
Status: DISCONTINUED | OUTPATIENT
Start: 2022-07-19 | End: 2022-07-19 | Stop reason: HOSPADM

## 2022-07-19 RX ORDER — OXYCODONE HYDROCHLORIDE AND ACETAMINOPHEN 5; 325 MG/1; MG/1
1 TABLET ORAL EVERY 8 HOURS PRN
Qty: 25 TABLET | Refills: 0 | Status: SHIPPED | OUTPATIENT
Start: 2022-07-19 | End: 2022-07-22

## 2022-07-19 RX ORDER — DEXAMETHASONE SODIUM PHOSPHATE 10 MG/ML
INJECTION, SOLUTION INTRAMUSCULAR; INTRAVENOUS PRN
Status: DISCONTINUED | OUTPATIENT
Start: 2022-07-19 | End: 2022-07-19 | Stop reason: SDUPTHER

## 2022-07-19 RX ORDER — CYCLOBENZAPRINE HCL 10 MG
10 TABLET ORAL 3 TIMES DAILY PRN
Qty: 21 TABLET | Refills: 2 | Status: SHIPPED | OUTPATIENT
Start: 2022-07-19 | End: 2022-07-29

## 2022-07-19 RX ORDER — SODIUM CHLORIDE 0.9 % (FLUSH) 0.9 %
5-40 SYRINGE (ML) INJECTION EVERY 12 HOURS SCHEDULED
Status: DISCONTINUED | OUTPATIENT
Start: 2022-07-19 | End: 2022-07-19 | Stop reason: HOSPADM

## 2022-07-19 RX ORDER — PROPOFOL 10 MG/ML
INJECTION, EMULSION INTRAVENOUS PRN
Status: DISCONTINUED | OUTPATIENT
Start: 2022-07-19 | End: 2022-07-19 | Stop reason: SDUPTHER

## 2022-07-19 RX ORDER — SUCCINYLCHOLINE CHLORIDE 20 MG/ML
INJECTION INTRAMUSCULAR; INTRAVENOUS PRN
Status: DISCONTINUED | OUTPATIENT
Start: 2022-07-19 | End: 2022-07-19 | Stop reason: SDUPTHER

## 2022-07-19 RX ORDER — CYCLOBENZAPRINE HCL 5 MG
10 TABLET ORAL 3 TIMES DAILY
Status: DISCONTINUED | OUTPATIENT
Start: 2022-07-19 | End: 2022-07-22 | Stop reason: HOSPADM

## 2022-07-19 RX ORDER — MORPHINE SULFATE 2 MG/ML
2 INJECTION, SOLUTION INTRAMUSCULAR; INTRAVENOUS EVERY 5 MIN PRN
Status: DISCONTINUED | OUTPATIENT
Start: 2022-07-19 | End: 2022-07-19 | Stop reason: HOSPADM

## 2022-07-19 RX ORDER — SODIUM CHLORIDE 9 MG/ML
INJECTION, SOLUTION INTRAVENOUS CONTINUOUS
Status: DISCONTINUED | OUTPATIENT
Start: 2022-07-19 | End: 2022-07-19

## 2022-07-19 RX ORDER — MIDAZOLAM HYDROCHLORIDE 1 MG/ML
INJECTION INTRAMUSCULAR; INTRAVENOUS PRN
Status: DISCONTINUED | OUTPATIENT
Start: 2022-07-19 | End: 2022-07-19 | Stop reason: SDUPTHER

## 2022-07-19 RX ORDER — ONDANSETRON 2 MG/ML
4 INJECTION INTRAMUSCULAR; INTRAVENOUS EVERY 6 HOURS PRN
Status: DISCONTINUED | OUTPATIENT
Start: 2022-07-19 | End: 2022-07-22 | Stop reason: HOSPADM

## 2022-07-19 RX ORDER — SODIUM CHLORIDE, SODIUM LACTATE, POTASSIUM CHLORIDE, CALCIUM CHLORIDE 600; 310; 30; 20 MG/100ML; MG/100ML; MG/100ML; MG/100ML
INJECTION, SOLUTION INTRAVENOUS CONTINUOUS
Status: DISCONTINUED | OUTPATIENT
Start: 2022-07-19 | End: 2022-07-19

## 2022-07-19 RX ORDER — BUPIVACAINE HYDROCHLORIDE 2.5 MG/ML
INJECTION, SOLUTION EPIDURAL; INFILTRATION; INTRACAUDAL
Status: DISCONTINUED
Start: 2022-07-19 | End: 2022-07-19 | Stop reason: WASHOUT

## 2022-07-19 RX ORDER — ONDANSETRON 4 MG/1
4 TABLET, ORALLY DISINTEGRATING ORAL EVERY 8 HOURS PRN
Status: DISCONTINUED | OUTPATIENT
Start: 2022-07-19 | End: 2022-07-22 | Stop reason: HOSPADM

## 2022-07-19 RX ORDER — ENOXAPARIN SODIUM 100 MG/ML
30 INJECTION SUBCUTANEOUS 2 TIMES DAILY
Status: DISCONTINUED | OUTPATIENT
Start: 2022-07-19 | End: 2022-07-22 | Stop reason: HOSPADM

## 2022-07-19 RX ORDER — OXYCODONE HYDROCHLORIDE AND ACETAMINOPHEN 5; 325 MG/1; MG/1
1 TABLET ORAL
Status: DISCONTINUED | OUTPATIENT
Start: 2022-07-19 | End: 2022-07-19 | Stop reason: HOSPADM

## 2022-07-19 RX ORDER — MIDAZOLAM HYDROCHLORIDE 2 MG/2ML
2 INJECTION, SOLUTION INTRAMUSCULAR; INTRAVENOUS
Status: DISCONTINUED | OUTPATIENT
Start: 2022-07-19 | End: 2022-07-19 | Stop reason: HOSPADM

## 2022-07-19 RX ORDER — MORPHINE SULFATE 2 MG/ML
INJECTION, SOLUTION INTRAMUSCULAR; INTRAVENOUS
Status: COMPLETED
Start: 2022-07-19 | End: 2022-07-19

## 2022-07-19 RX ORDER — SODIUM CHLORIDE 0.9 % (FLUSH) 0.9 %
10 SYRINGE (ML) INJECTION EVERY 12 HOURS SCHEDULED
Status: DISCONTINUED | OUTPATIENT
Start: 2022-07-19 | End: 2022-07-22 | Stop reason: HOSPADM

## 2022-07-19 RX ORDER — SODIUM CHLORIDE 9 MG/ML
25 INJECTION, SOLUTION INTRAVENOUS PRN
Status: DISCONTINUED | OUTPATIENT
Start: 2022-07-19 | End: 2022-07-19 | Stop reason: HOSPADM

## 2022-07-19 RX ORDER — ONDANSETRON 2 MG/ML
INJECTION INTRAMUSCULAR; INTRAVENOUS
Status: COMPLETED
Start: 2022-07-19 | End: 2022-07-19

## 2022-07-19 RX ORDER — LIDOCAINE HYDROCHLORIDE 20 MG/ML
INJECTION, SOLUTION INFILTRATION; PERINEURAL
Status: COMPLETED
Start: 2022-07-19 | End: 2022-07-19

## 2022-07-19 RX ORDER — MIDAZOLAM HYDROCHLORIDE 1 MG/ML
INJECTION INTRAMUSCULAR; INTRAVENOUS
Status: COMPLETED
Start: 2022-07-19 | End: 2022-07-19

## 2022-07-19 RX ORDER — DEXTROSE AND SODIUM CHLORIDE 5; .45 G/100ML; G/100ML
INJECTION, SOLUTION INTRAVENOUS CONTINUOUS
Status: DISCONTINUED | OUTPATIENT
Start: 2022-07-19 | End: 2022-07-22

## 2022-07-19 RX ORDER — ONDANSETRON 2 MG/ML
INJECTION INTRAMUSCULAR; INTRAVENOUS PRN
Status: DISCONTINUED | OUTPATIENT
Start: 2022-07-19 | End: 2022-07-19 | Stop reason: SDUPTHER

## 2022-07-19 RX ORDER — SODIUM CHLORIDE 9 MG/ML
INJECTION, SOLUTION INTRAVENOUS PRN
Status: DISCONTINUED | OUTPATIENT
Start: 2022-07-19 | End: 2022-07-19 | Stop reason: HOSPADM

## 2022-07-19 RX ORDER — POLYETHYLENE GLYCOL 3350 17 G/17G
17 POWDER, FOR SOLUTION ORAL DAILY
Status: DISCONTINUED | OUTPATIENT
Start: 2022-07-19 | End: 2022-07-22 | Stop reason: HOSPADM

## 2022-07-19 RX ORDER — LIDOCAINE HYDROCHLORIDE 20 MG/ML
INJECTION, SOLUTION EPIDURAL; INFILTRATION; INTRACAUDAL; PERINEURAL
Status: COMPLETED | OUTPATIENT
Start: 2022-07-19 | End: 2022-07-19

## 2022-07-19 RX ORDER — SODIUM CHLORIDE 0.9 % (FLUSH) 0.9 %
10 SYRINGE (ML) INJECTION PRN
Status: DISCONTINUED | OUTPATIENT
Start: 2022-07-19 | End: 2022-07-22 | Stop reason: HOSPADM

## 2022-07-19 RX ORDER — ACETAMINOPHEN 500 MG
1000 TABLET ORAL EVERY 8 HOURS SCHEDULED
Status: DISCONTINUED | OUTPATIENT
Start: 2022-07-19 | End: 2022-07-22 | Stop reason: HOSPADM

## 2022-07-19 RX ORDER — SENNA PLUS 8.6 MG/1
1 TABLET ORAL DAILY PRN
Status: DISCONTINUED | OUTPATIENT
Start: 2022-07-19 | End: 2022-07-22 | Stop reason: HOSPADM

## 2022-07-19 RX ORDER — DOCUSATE SODIUM 100 MG/1
100 CAPSULE, LIQUID FILLED ORAL 2 TIMES DAILY PRN
Qty: 30 CAPSULE | Refills: 0 | Status: SHIPPED | OUTPATIENT
Start: 2022-07-19

## 2022-07-19 RX ORDER — IBUPROFEN 800 MG/1
800 TABLET ORAL 3 TIMES DAILY
Qty: 60 TABLET | Refills: 0 | Status: SHIPPED | OUTPATIENT
Start: 2022-07-19

## 2022-07-19 RX ORDER — INDOCYANINE GREEN AND WATER 25 MG
KIT INJECTION
Status: DISPENSED
Start: 2022-07-19 | End: 2022-07-20

## 2022-07-19 RX ORDER — OXYCODONE HYDROCHLORIDE AND ACETAMINOPHEN 5; 325 MG/1; MG/1
2 TABLET ORAL
Status: DISCONTINUED | OUTPATIENT
Start: 2022-07-19 | End: 2022-07-19 | Stop reason: HOSPADM

## 2022-07-19 RX ORDER — ROPIVACAINE HYDROCHLORIDE 5 MG/ML
INJECTION, SOLUTION EPIDURAL; INFILTRATION; PERINEURAL
Status: COMPLETED | OUTPATIENT
Start: 2022-07-19 | End: 2022-07-19

## 2022-07-19 RX ORDER — GABAPENTIN 300 MG/1
300 CAPSULE ORAL 3 TIMES DAILY
Status: DISCONTINUED | OUTPATIENT
Start: 2022-07-19 | End: 2022-07-22 | Stop reason: HOSPADM

## 2022-07-19 RX ORDER — ONDANSETRON 2 MG/ML
4 INJECTION INTRAMUSCULAR; INTRAVENOUS
Status: COMPLETED | OUTPATIENT
Start: 2022-07-19 | End: 2022-07-19

## 2022-07-19 RX ADMIN — PROPOFOL 200 MG: 10 INJECTION, EMULSION INTRAVENOUS at 15:45

## 2022-07-19 RX ADMIN — SODIUM CHLORIDE, POTASSIUM CHLORIDE, SODIUM LACTATE AND CALCIUM CHLORIDE: 600; 310; 30; 20 INJECTION, SOLUTION INTRAVENOUS at 16:32

## 2022-07-19 RX ADMIN — ONDANSETRON 4 MG: 2 INJECTION INTRAMUSCULAR; INTRAVENOUS at 17:44

## 2022-07-19 RX ADMIN — GABAPENTIN 300 MG: 300 CAPSULE ORAL at 22:34

## 2022-07-19 RX ADMIN — ONDANSETRON 4 MG: 2 INJECTION INTRAMUSCULAR; INTRAVENOUS at 17:15

## 2022-07-19 RX ADMIN — FENTANYL CITRATE 50 MCG: 50 INJECTION, SOLUTION INTRAMUSCULAR; INTRAVENOUS at 15:50

## 2022-07-19 RX ADMIN — HYDROMORPHONE HYDROCHLORIDE 0.5 MG: 1 INJECTION, SOLUTION INTRAMUSCULAR; INTRAVENOUS; SUBCUTANEOUS at 17:44

## 2022-07-19 RX ADMIN — CEFAZOLIN 3000 MG: 1 INJECTION, POWDER, FOR SOLUTION INTRAMUSCULAR; INTRAVENOUS at 15:40

## 2022-07-19 RX ADMIN — ACETAMINOPHEN 1000 MG: 500 TABLET ORAL at 22:33

## 2022-07-19 RX ADMIN — SODIUM CHLORIDE, PRESERVATIVE FREE 10 ML: 5 INJECTION INTRAVENOUS at 22:39

## 2022-07-19 RX ADMIN — ROCURONIUM BROMIDE 5 MG: 10 INJECTION, SOLUTION INTRAVENOUS at 15:45

## 2022-07-19 RX ADMIN — DEXTROSE AND SODIUM CHLORIDE: 5; 450 INJECTION, SOLUTION INTRAVENOUS at 22:33

## 2022-07-19 RX ADMIN — HYDROMORPHONE HYDROCHLORIDE 0.5 MG: 1 INJECTION, SOLUTION INTRAMUSCULAR; INTRAVENOUS; SUBCUTANEOUS at 18:17

## 2022-07-19 RX ADMIN — LIDOCAINE HYDROCHLORIDE 50 ML: 20 INJECTION, SOLUTION EPIDURAL; INFILTRATION; INTRACAUDAL; PERINEURAL at 15:45

## 2022-07-19 RX ADMIN — MORPHINE SULFATE 2 MG: 2 INJECTION, SOLUTION INTRAMUSCULAR; INTRAVENOUS at 18:41

## 2022-07-19 RX ADMIN — CYCLOBENZAPRINE HYDROCHLORIDE 10 MG: 5 TABLET, FILM COATED ORAL at 22:34

## 2022-07-19 RX ADMIN — CEFAZOLIN 2000 MG: 10 INJECTION, POWDER, FOR SOLUTION INTRAVENOUS at 22:37

## 2022-07-19 RX ADMIN — MIDAZOLAM HYDROCHLORIDE: 1 INJECTION, SOLUTION INTRAMUSCULAR; INTRAVENOUS at 15:00

## 2022-07-19 RX ADMIN — LIDOCAINE HYDROCHLORIDE 40 ML: 20 INJECTION, SOLUTION EPIDURAL; INFILTRATION; INTRACAUDAL; PERINEURAL at 15:12

## 2022-07-19 RX ADMIN — PROMETHAZINE HYDROCHLORIDE 6.25 MG: 25 INJECTION, SOLUTION INTRAMUSCULAR; INTRAVENOUS at 18:40

## 2022-07-19 RX ADMIN — MIDAZOLAM HYDROCHLORIDE 2 MG: 1 INJECTION, SOLUTION INTRAMUSCULAR; INTRAVENOUS at 15:40

## 2022-07-19 RX ADMIN — FENTANYL CITRATE 50 MCG: 50 INJECTION, SOLUTION INTRAMUSCULAR; INTRAVENOUS at 15:40

## 2022-07-19 RX ADMIN — LIDOCAINE HYDROCHLORIDE 50 MG: 10 INJECTION, SOLUTION EPIDURAL; INFILTRATION; INTRACAUDAL; PERINEURAL at 15:40

## 2022-07-19 RX ADMIN — POLYETHYLENE GLYCOL 3350 17 G: 17 POWDER, FOR SOLUTION ORAL at 22:35

## 2022-07-19 RX ADMIN — SUCCINYLCHOLINE CHLORIDE 160 MG: 20 INJECTION, SOLUTION INTRAMUSCULAR; INTRAVENOUS at 15:45

## 2022-07-19 RX ADMIN — PROPOFOL 50 MG: 10 INJECTION, EMULSION INTRAVENOUS at 15:53

## 2022-07-19 RX ADMIN — INDOCYANINE GREEN AND WATER 5 MG: KIT at 14:59

## 2022-07-19 RX ADMIN — HYDROMORPHONE HYDROCHLORIDE 0.5 MG: 1 INJECTION, SOLUTION INTRAMUSCULAR; INTRAVENOUS; SUBCUTANEOUS at 18:00

## 2022-07-19 RX ADMIN — PROPOFOL 50 MG: 10 INJECTION, EMULSION INTRAVENOUS at 16:10

## 2022-07-19 RX ADMIN — SUGAMMADEX 500 MG: 100 INJECTION, SOLUTION INTRAVENOUS at 17:19

## 2022-07-19 RX ADMIN — MORPHINE SULFATE 2 MG: 2 INJECTION, SOLUTION INTRAMUSCULAR; INTRAVENOUS at 19:44

## 2022-07-19 RX ADMIN — FENTANYL CITRATE 25 MCG: 50 INJECTION, SOLUTION INTRAMUSCULAR; INTRAVENOUS at 17:24

## 2022-07-19 RX ADMIN — FENTANYL CITRATE 50 MCG: 50 INJECTION, SOLUTION INTRAMUSCULAR; INTRAVENOUS at 16:12

## 2022-07-19 RX ADMIN — KETOROLAC TROMETHAMINE 30 MG: 30 INJECTION, SOLUTION INTRAMUSCULAR; INTRAVENOUS at 17:15

## 2022-07-19 RX ADMIN — DEXAMETHASONE SODIUM PHOSPHATE 10 MG: 10 INJECTION INTRAMUSCULAR; INTRAVENOUS at 16:02

## 2022-07-19 RX ADMIN — ENOXAPARIN SODIUM 30 MG: 100 INJECTION SUBCUTANEOUS at 22:34

## 2022-07-19 RX ADMIN — ROCURONIUM BROMIDE 40 MG: 10 INJECTION, SOLUTION INTRAVENOUS at 15:54

## 2022-07-19 RX ADMIN — PROMETHAZINE HYDROCHLORIDE 6.25 MG: 25 INJECTION INTRAMUSCULAR; INTRAVENOUS at 18:40

## 2022-07-19 RX ADMIN — ROPIVACAINE HYDROCHLORIDE 40 ML: 5 INJECTION, SOLUTION EPIDURAL; INFILTRATION; PERINEURAL at 15:12

## 2022-07-19 RX ADMIN — FENTANYL CITRATE 25 MCG: 50 INJECTION, SOLUTION INTRAMUSCULAR; INTRAVENOUS at 17:38

## 2022-07-19 RX ADMIN — SODIUM CHLORIDE, POTASSIUM CHLORIDE, SODIUM LACTATE AND CALCIUM CHLORIDE: 600; 310; 30; 20 INJECTION, SOLUTION INTRAVENOUS at 15:23

## 2022-07-19 RX ADMIN — FENTANYL CITRATE: 50 INJECTION, SOLUTION INTRAMUSCULAR; INTRAVENOUS at 15:00

## 2022-07-19 ASSESSMENT — LIFESTYLE VARIABLES: SMOKING_STATUS: 1

## 2022-07-19 ASSESSMENT — PAIN DESCRIPTION - LOCATION
LOCATION: ABDOMEN

## 2022-07-19 ASSESSMENT — PAIN DESCRIPTION - PAIN TYPE
TYPE: SURGICAL PAIN

## 2022-07-19 ASSESSMENT — PAIN SCALES - GENERAL
PAINLEVEL_OUTOF10: 7
PAINLEVEL_OUTOF10: 9
PAINLEVEL_OUTOF10: 6
PAINLEVEL_OUTOF10: 5
PAINLEVEL_OUTOF10: 4
PAINLEVEL_OUTOF10: 5
PAINLEVEL_OUTOF10: 5
PAINLEVEL_OUTOF10: 0
PAINLEVEL_OUTOF10: 9
PAINLEVEL_OUTOF10: 6
PAINLEVEL_OUTOF10: 10

## 2022-07-19 ASSESSMENT — PAIN DESCRIPTION - FREQUENCY: FREQUENCY: CONTINUOUS

## 2022-07-19 ASSESSMENT — PAIN DESCRIPTION - DESCRIPTORS
DESCRIPTORS: SHARP
DESCRIPTORS: ACHING;BURNING;SHARP
DESCRIPTORS: ACHING;DISCOMFORT
DESCRIPTORS: STABBING
DESCRIPTORS: STABBING
DESCRIPTORS: ACHING;DISCOMFORT

## 2022-07-19 ASSESSMENT — PAIN DESCRIPTION - ORIENTATION: ORIENTATION: RIGHT;UPPER

## 2022-07-19 ASSESSMENT — PAIN - FUNCTIONAL ASSESSMENT: PAIN_FUNCTIONAL_ASSESSMENT: PREVENTS OR INTERFERES SOME ACTIVE ACTIVITIES AND ADLS

## 2022-07-19 ASSESSMENT — PAIN SCALES - WONG BAKER
WONGBAKER_NUMERICALRESPONSE: 2
WONGBAKER_NUMERICALRESPONSE: 0

## 2022-07-19 NOTE — H&P
HxCC:  19 y/o male presents for evaluation of abdominal pain. Patient with 4 yr hx of right upper quadrant and epigastric pressure and bloating. Patient also states pain is sometimes sharp. Pain does not radiate to back. Denies nausea or vomiting. Pain worse with eating. Denies changes in bowel habits. Denies melena or hematochezia. Vitals:     07/14/22 1012   Pulse: 94   Resp: 14   SpO2: 97%             Patient Active Problem List   Diagnosis    Right upper quadrant abdominal pain         Current Facility-Administered Medications          Current Outpatient Medications   Medication Sig Dispense Refill    ibuprofen (ADVIL;MOTRIN) 600 MG tablet Take 1 tablet by mouth every 6 hours as needed for Pain 30 tablet 0    esomeprazole (NEXIUM) 40 MG delayed release capsule Take 1 capsule by mouth every morning (before breakfast) 30 capsule 0      No current facility-administered medications for this visit. No Known Allergies     Past Medical History        Past Medical History:   Diagnosis Date    ADHD (attention deficit hyperactivity disorder)      Depressed              Past Surgical History         Past Surgical History:   Procedure Laterality Date    TONSILLECTOMY AND ADENOIDECTOMY                Family History   History reviewed. No pertinent family history.         Social History               Socioeconomic History    Marital status:        Spouse name: Not on file    Number of children: Not on file    Years of education: Not on file    Highest education level: Not on file   Occupational History    Not on file   Tobacco Use    Smoking status: Light Tobacco Smoker       Packs/day: 0.25       Types: Cigarettes, E-Cigarettes       Start date: 2013    Smokeless tobacco: Former User       Types: Chew   Vaping Use    Vaping Use: Never used   Substance and Sexual Activity    Alcohol use: Not Currently       Comment: social    Drug use: Not Currently       Types: Marijuana Janet Saul    Sexual activity: Not on file   Other Topics Concern    Not on file   Social History Narrative    Not on file      Social Determinants of Health          Financial Resource Strain: Low Risk    Difficulty of Paying Living Expenses: Not hard at all   Food Insecurity: No Food Insecurity    Worried About [address] in the Last Year: Never true    [address] in the Last Year: Never true   Transportation Needs:    Lack of Transportation (Medical): Not on file    Lack of Transportation (Non-Medical): Not on file   Physical Activity:    Days of Exercise per Week: Not on file    Minutes of Exercise per Session: Not on file   Stress:    Feeling of Stress : Not on file   Social Connections:    Frequency of Communication with Friends and Family: Not on file    Frequency of Social Gatherings with Friends and Family: Not on file    Attends Confucianism Services: Not on file    Active Member of Clubs or Organizations: Not on file    Attends Club or Organization Meetings: Not on file    Marital Status: Not on file   Intimate Partner Violence:    Fear of Current or Ex-Partner: Not on file    Emotionally Abused: Not on file    Physically Abused: Not on file    Sexually Abused: Not on file   Housing Stability:    Unable to Pay for Housing in the Last Year: Not on file    Number of [place] in the Last Year: Not on file    Unstable Housing in the Last Year: Not on file            Review of Systems:  14 systems were reviewed. Positives noted above. Remainder are negative per CMS guidelines. Exam  General: AAOx3, NAD  Head: atraumatic normocephalic  Neck: trachea midline. No masses or lymphadenopathy  Heart: RRR with no murmurs  Lungs: BCTA  Abdomen: soft and nondistended. Right upper quadrant and epigastric tenderness but no guarding, no rebound, and no rigidity.   Ext: motor 5/5 all extremities with no gross deformities     WBC 3.5 - 11.0 k/uL 13.9 High    RBC 4.5 - 5.9 m/uL 5.80   Hemoglobin 13.5 - 17.5 g/dL 15.5 Hematocrit 41 - 53 % 45.2   MCV 80 - 100 fL 77.9 Low    MCH 26 - 34 pg 26.8   MCHC 31 - 37 g/dL 34.4   RDW 12.5 - 15.4 % 14.2   Platelets 262 - 052 k/uL 305   MPV 6.0 - 12.0 fL 8.1   Seg Neutrophils 36 - 66 % 65   Lymphocytes 24 - 44 % 24   Monocytes 2 - 11 % 7   Eosinophils % 1 - 4 % 3   Basophils 0 - 2 % 1   Segs Absolute 1.8 - 7.7 k/uL 9.10 High    Absolute Lymph # 1.0 - 4.8 k/uL 3.30   Absolute Mono # 0.1 - 1.2 k/uL 0.90   Absolute Eos # 0.0 - 0.4 k/uL 0.40   Basophils Absolute 0.0 - 0.2 k/uL 0.10      Albumin 3.5 - 5.2 g/dL 5.0   Alkaline Phosphatase 40 - 129 U/L 94   ALT 5 - 41 U/L 77 High    AST <40 U/L 38   Total Bilirubin 0.3 - 1.2 mg/dL 0.81   Bilirubin, Direct <0.31 mg/dL 0.15   Bilirubin, Indirect 0.00 - 1.00 mg/dL 0.66   Total Protein 6.4 - 8.3 g/dL 8.1   Albumin/Globulin Ratio 1.0 - 2.5 1.6         EXAMINATION:   RIGHT UPPER QUADRANT ULTRASOUND       7/6/2022 12:55 pm       COMPARISON:   CT abdomen pelvis from 03/08/2022       HISTORY:   ORDERING SYSTEM PROVIDED HISTORY: Pain   TECHNOLOGIST PROVIDED HISTORY:   Pain       79-year-old male with right upper quadrant abdominal ultrasound       FINDINGS:   Exam is limited as patient was unable to handle for old fracture as well as   patient's body habitus. LIVER: Diffuse fatty infiltration of the liver with focal fatty sparing. No   intrahepatic biliary ductal dilation. Hepatomegaly. Liver length measures   17.9 cm. BILIARY SYSTEM:  Gallbladder is unremarkable without evidence of   pericholecystic fluid, wall thickening or stones. Negative sonographic   Nolan's sign. Gallbladder wall thickness measures 3 mm. Common bile duct is mildly dilated measuring 6 mm. RIGHT KIDNEY: Right kidney measures 12.9 x 5.9 x 5.6 cm. Right renal   cortical thickness measures 2.1 cm. No gross right-sided hydronephrosis. PANCREAS:  Nonvisualization of the pancreas. OTHER: No evidence of right upper quadrant ascites.

## 2022-07-19 NOTE — ANESTHESIA POSTPROCEDURE EVALUATION
Department of Anesthesiology  Postprocedure Note    Patient: Samantha Hare  MRN: [id number]  YOB: 2000  Date of evaluation: 7/19/2022      Procedure Summary     Date: 07/19/22 Room / Location: [address]    Anesthesia Start: 0386 Anesthesia Stop: 0667    Procedure: [address] 5546 (Abdomen) Diagnosis:       Right upper quadrant pain      Biliary colic      (Right upper quadrant pain [R10.11])      (Biliary colic [B85.21])    Surgeons: Leoncio Rodriguez IV, DO Responsible Provider: Tyrel Murphy MD    Anesthesia Type: general, regional ASA Status: 3          Anesthesia Type: No value filed.     Vanessa Phase I: Vanessa Score: 10    Vanessa Phase II:        Anesthesia Post Evaluation    Patient location during evaluation: PACU  Patient participation: complete - patient participated  Level of consciousness: awake and alert  Airway patency: patent  Nausea & Vomiting: no nausea and no vomiting  Complications: no  Cardiovascular status: hemodynamically stable  Respiratory status: face mask and spontaneous ventilation  Hydration status: euvolemic  Multimodal analgesia pain management approach

## 2022-07-19 NOTE — OP NOTE
OPERATIVE NOTE    PATIENT: Sindi Pacheco    MRN: [id number]    DATE OF PROCEDURE: 7/19/2022     SURGEON: Amador Ramos DO    ASSISTANT: Henrique Hernandez MD    PREOPERATIVE DIAGNOSIS: Biliary colic    POSTOPERATIVE DIAGNOSIS: Same     OPERATION: Robotic assisted laparoscopic subtotal cholecystectomy    FINDINGS: Distended gallbladder with omental adhesions. Unable to delineate anatomy due to patient habitus, so decision was made to proceed with subtotal cholecystectomy. ANESTHESIA: General    ESTIMATED BLOOD LOSS:  5 cc  URINE OUTPUT: 0 cc  FLUIDS: 4045 cc    COMPLICATIONS: None     IMPLANTS:  * No implants in log *      SPECIMENS:   ID Type Source Tests Collected by Time Destination   A : GALLBLADDER AND CONTENTS Tissue Gallbladder SURGICAL PATHOLOGY Callie Rodriguez IV, DO 7/19/2022 1655         INDICATION: Patient is a 39-year-old male who presented with 4-year history of abdominal pain worsened with eating. Right upper quadrant ultrasound showed fatty liver with hepatomegaly and mildly dilated common bile duct measuring 6 mm. DETAILS OF OPERATION: The patient was seen and evaluated in the preoperative holding area. Risks, benefits, and alternatives of the procedure were discussed with the patient and they agreed with treatment plan. The patient was transferred to the operating theater and transferred to the operating table. Patient was placed in the supine position and all appropriate hemodynamic monitoring and intravenous access was obtained. Appropriate preoperative antibiotics were given. Endotracheal intubation was then performed for general anesthesia. All pressure points were appropriately padded and the abdomen was prepped and draped in the standard sterile fashion. A surgical timeout was performed confirming the patient, procedure to be performed, laterality, and all other pertinent information.      Entrance was gained into the abdomen with a Veress needle placed at Hogan's point in the left upper quadrant. A saline drop test was used to confirm entrance into the abdomen. The abdomen was insufflated with carbon dioxide to a pressure of 15 mmHg. A 12 mm Optiview port was then placed into the left upper quadrant. The camera was then inserted and an 8 mm port was placed at the umbilicus under direct visualization. The abdomen was inspected and there was no injury during entrance into the abdomen. We noted a large diverticulum in the sigmoid colon with no purulent drainage and no inflammation. No intervention was done with this finding. A 2nd 8 mm instrument port was placed 10 cm to the right lateral to the umbilicus and another between these. The patient was placed in reverse Trendelenburg position with the right side up. Then [employer] machine was then docked and a camera placed without complication. A monopolar hook was then placed in the right arm and a fenestrated bipolar grasper was placed in the left arm. Filmy adhesions between the gallbladder and the omentum were lysed carefully with electrocautery. The fundus of the gallbladder was grasped and directed towards the patient's right shoulder. The infundibulum of the gallbladder was grasped with a bipolar grasper. Due to patient habitus, we were unable to safely delineate the critical view of safety and therefore we proceeded with a subtotal cholecystectomy. We began by making an I shaped incision with hook monopolar starting at the fundus of the gallbladder. Once the lumen of the gallbladder was entered, a robotic suction  was inserted and the gallbladder contents were suctioned out. There were no free stones that were encountered, however the entire mucosa of the gallbladder was covered with small lesions of unknown origin. Possibly polypoid lesions versus cholesterol deposits or forming stones. We carried the incision down towards the neck of the gallbladder until we were able to visualize the os.   A 2-0 VLock suture was then used to close the office in a pursestring fashion. Once this was completed, the gallbladder leaflets were removed from the cystic plate with the monopolar hook electrocautery. The gallbladder leaflets were removed using an Endo Catch bag through the 12 mm port. The liver bed was then inspected for hemostasis and we were satisfied. A round Rise John drain was placed in the gallbladder fossa and right paracolic gutter and brought out through the right lateral port site. The [facility] [place] was undocked from the patient and the abdomen was irrigated with normal saline until clear fluid was returned. The camera and the umbilical port were removed from the abdomen. The LUQ fascia was closed with an 0 Vicryl suture with a needle nose suture passer and a Enzo-Tammi needle. Working ports were removed. All incisions were closed with simple subcuticular 4-0 Monocryl sutures and the drain was stitched in place with a 3-0 nylon drain stitch. The patient was then transferred off the table and to the postoperative care unit. The patient tolerated the procedure well and there were no immediate complications. Counts were correct at case conclusion.      Electronically signed by Leslye Rodriguez IV, DO on 7/20/22 at 1:34 PM EDT

## 2022-07-19 NOTE — ANESTHESIA PRE PROCEDURE
Department of Anesthesiology  Preprocedure Note       Name:  Ashli Watkins   Age:  20 y.o.  :  2000                                          MRN:  [id number]         Date:  2022      Surgeon: Jammie Krishna):  Albert Rodriguez IV, DO    Procedure: Procedure(s):  CHOLECYSTECTOMY LAPAROSCOPIC ROBOTIC WITH FIREFLY    Medications prior to admission:   Prior to Admission medications    Medication Sig Start Date End Date Taking?  Authorizing Provider   ibuprofen (ADVIL;MOTRIN) 600 MG tablet Take 1 tablet by mouth every 6 hours as needed for Pain  Patient not taking: Reported on 2022   Nallely Bhatia MD   esomeprazole ([address]) 40 MG delayed release capsule Take 1 capsule by mouth every morning (before breakfast)  Patient not taking: Reported on 2022   Nallely Bhatia MD       Current medications:    Current Facility-Administered Medications   Medication Dose Route Frequency Provider Last Rate Last Admin    0.9 % sodium chloride infusion   IntraVENous Continuous Vikram Celestin MD        lactated ringers infusion   IntraVENous Continuous Vikram Celestin MD        sodium chloride flush 0.9 % injection 5-40 mL  5-40 mL IntraVENous 2 times per day Vikram Celestin MD        sodium chloride flush 0.9 % injection 5-40 mL  5-40 mL IntraVENous PRN Vikram Celestin MD        0.9 % sodium chloride infusion   IntraVENous PRN Vikram Celestin MD        ceFAZolin (ANCEF) 3000 mg in dextrose 5 % 100 mL IVPB  3,000 mg IntraVENous Once [employer] IV, DO        indocyanine green (IC-GREEN) syringe 5 mg  5 mg IntraVENous Once [employer] IV, DO           Allergies:  No Known Allergies    Problem List:    Patient Active Problem List   Diagnosis Code    Right upper quadrant abdominal pain R10.11       Past Medical History:        Diagnosis Date    ADHD (attention deficit hyperactivity disorder)     no meds since  age 11    Depressed     Morbid obesity ([facility] [address]     PTSD (post-traumatic stress disorder)        Past Surgical History:        Procedure Laterality Date    TONSILLECTOMY AND ADENOIDECTOMY         Social History:    Social History     Tobacco Use    Smoking status: Light Smoker     Packs/day: 0.25     Types: Cigarettes, E-Cigarettes     Start date: 2013    Smokeless tobacco: Former     Types: Chew   Substance Use Topics    Alcohol use: Not Currently     Comment: social                                Ready to quit: Not Answered  Counseling given: Not Answered      Vital Signs (Current):   Vitals:    07/14/22 1433 07/19/22 1238   BP:  (!) 110/57   Pulse:  81   Resp:  17   Temp:  98 °F (36.7 °C)   SpO2:  98%   Weight: (!) 385 lb (174.6 kg) (!) 386 lb (175.1 kg)   Height:  6' 1\" (1.854 m)                                              BP Readings from Last 3 Encounters:   07/19/22 (!) 110/57   07/06/22 (!) 157/89   03/08/22 128/84       NPO Status: Time of last liquid consumption: 2345                        Time of last solid consumption: 2345                        Date of last liquid consumption: 07/18/22                        Date of last solid food consumption: 07/18/22    BMI:   Wt Readings from Last 3 Encounters:   07/19/22 (!) 386 lb (175.1 kg)   07/14/22 (!) 385 lb (174.6 kg)   07/06/22 (!) 385 lb (174.6 kg)     Body mass index is 50.93 kg/m².     CBC:   Lab Results   Component Value Date/Time    WBC 13.9 07/06/2022 01:08 PM    RBC 5.80 07/06/2022 01:08 PM    HGB 15.5 07/06/2022 01:08 PM    HCT 45.2 07/06/2022 01:08 PM    MCV 77.9 07/06/2022 01:08 PM    RDW 14.2 07/06/2022 01:08 PM     07/06/2022 01:08 PM       CMP:   Lab Results   Component Value Date/Time     07/06/2022 01:08 PM    K 4.4 07/06/2022 01:08 PM     07/06/2022 01:08 PM    CO2 26 07/06/2022 01:08 PM    BUN 9 07/06/2022 01:08 PM    CREATININE 0.63 07/06/2022 01:08 PM    GFRAA >60 07/06/2022 01:08 PM    LABGLOM >60 07/06/2022 01:08 PM    GLUCOSE 95 07/06/2022 01:08 PM    PROT 8.1 07/06/2022 01:08 PM    CALCIUM 10.1 07/06/2022 01:08 PM    BILITOT 0.81 07/06/2022 01:08 PM    ALKPHOS 94 07/06/2022 01:08 PM    AST 38 07/06/2022 01:08 PM    ALT 77 07/06/2022 01:08 PM       POC Tests: No results for input(s): POCGLU, POCNA, POCK, POCCL, POCBUN, POCHEMO, POCHCT in the last 72 hours. Coags: No results found for: PROTIME, INR, APTT    HCG (If Applicable): No results found for: PREGTESTUR, PREGSERUM, HCG, HCGQUANT     ABGs: No results found for: PHART, PO2ART, LZW1ABQ, NTV1KQG, BEART, [id number]     Type & Screen (If Applicable):  No results found for: LABABO, LABRH    Drug/Infectious Status (If Applicable):  No results found for: HIV, HEPCAB    COVID-19 Screening (If Applicable): No results found for: COVID19        Anesthesia Evaluation  Patient summary reviewed and Nursing notes reviewed  Airway: Mallampati: III  TM distance: >3 FB   Neck ROM: full  Mouth opening: > = 3 FB   Dental: normal exam         Pulmonary:normal exam    (+) COPD:  current smoker                          ROS comment: -SMOKES/VAPES FOR LAST 9 YEARS   Cardiovascular:Negative CV ROS                      Neuro/Psych:   Negative Neuro/Psych ROS              GI/Hepatic/Renal:   (+) morbid obesity          Endo/Other: Negative Endo/Other ROS                     ROS comment: -NPO AFTER MIDNIGHT  -NKDA Abdominal:             Vascular: negative vascular ROS. Other Findings:           Anesthesia Plan      general and regional     ASA 3     (GETA, TAP BLOCK)  Induction: intravenous. MIPS: Postoperative opioids intended and Prophylactic antiemetics administered. Anesthetic plan and risks discussed with patient. Plan discussed with CRNA.     Attending anesthesiologist reviewed and agrees with Nanette Paez MD   7/19/2022

## 2022-07-19 NOTE — ANESTHESIA PROCEDURE NOTES
Peripheral Block    Patient location during procedure: pre-op  Reason for block: post-op pain management and at surgeon's request  Start time: 7/19/2022 3:06 PM  End time: 7/19/2022 3:12 PM  Staffing  Performed: anesthesiologist   Anesthesiologist: Charles Maria MD  Preanesthetic Checklist  Completed: patient identified, IV checked, site marked, risks and benefits discussed, surgical/procedural consents, equipment checked, pre-op evaluation, timeout performed, anesthesia consent given, oxygen available, monitors applied/VS acknowledged, fire risk safety assessment completed and verbalized and blood product R/B/A discussed and consented  Peripheral Block   Patient position: supine  Prep: ChloraPrep  Provider prep: mask and sterile gloves  Patient monitoring: cardiac monitor, continuous pulse ox and frequent blood pressure checks  Block type: TAP  Laterality: bilateral  Injection technique: single-shot  Guidance: ultrasound guided    Needle   Needle type: insulated echogenic nerve stimulator needle   Needle gauge: 20 G  Needle localization: anatomical landmarks and ultrasound guidance  Needle length: 4 IN.   Assessment   Injection assessment: negative aspiration for heme, no paresthesia on injection, local visualized surrounding nerve on ultrasound and no intravascular symptoms  Paresthesia pain: none  Slow fractionated injection: yes  Hemodynamics: stableno  Outcomes: patient tolerated procedure well    Additional Notes  10MG DECADRON ADDED TO LOCAL ANESTHETIC SOLUTION  Medications Administered  lidocaine PF 2 % - Perineural, Abdominal Folds   40 mL - 7/19/2022 3:12:00 PM  ropivacaine (NAROPIN) injection 0.5% - Perineural, Abdominal Folds   40 mL - 7/19/2022 3:12:00 PM

## 2022-07-19 NOTE — DISCHARGE INSTRUCTIONS
Gallbladder Removal Surgery: What to Expect at Home  Your Recovery  After your surgery, you will likely feel weak and tired for several days after you return home. Your belly may be swollen. If you had laparoscopic surgery, it's normal to also have some shoulder pain. This is caused by the air thatyour doctor put in your belly to help see your organs better. You may have gas or need to burp a lot at first. A few people get diarrhea. Marcin Medrano usually goes away in 2 to 4 weeks, but it may last longer. How quickly you recover depends on whether you had a laparoscopic or opensurgery. For a laparoscopic surgery, most people can go back to work or their normal routine in 1 to 2 weeks. But it may take longer, depending on the type of work you do. For an open surgery, it will probably take 4 to 6 weeks before you get back to your normal routine. This care sheet gives you a general idea about how long it will take for you to recover. However, each person recovers at a different pace. Follow the stepsbelow to get better as quickly as possible. How can you care for yourself at home? Activity    Rest when you feel tired. Getting enough sleep will help you recover. Try to walk each day. Start out by walking a little more than you did the day before. Walking helps prevent blood clots in your legs and pneumonia. No lifting, pushing, or pulling weight greater than 10 lbs for 6 weeks. This may include a child, heavy grocery bags and milk containers, a heavy briefcase or backpack, cat litter or dog food bags, or a vacuum . Avoid strenuous activities, such as biking, jogging, weightlifting, and aerobic exercise, until your doctor says it is okay. Ask your doctor when you can drive again. For a laparoscopic surgery, most people can go back to work or their normal routine in 1 to 2 weeks, but it may take longer.  For an open surgery, it will probably take 4 to 6 weeks before you get back to your normal routine. Your doctor will tell you when you can have sex again. Diet    When you feel like eating, start with small amounts of food. You may want to avoid fatty foods like fried foods or cheese for a while. They can cause symptoms, such as diarrhea or bloating. Drink plenty of fluids (unless your doctor tells you not to). You may notice that your bowel movements are not regular right after your surgery. This is common. Try to avoid constipation and straining with bowel movements. You may want to take a fiber supplement every day. If you have not had a bowel movement after a couple of days, ask your doctor about taking a mild laxative. Medicines    Your doctor will tell you if and when you can restart your medicines. You will also be given instructions about taking any new medicines. If you take aspirin or some other blood thinner, ask your doctor if and when to start taking it again. Make sure that you understand exactly what your doctor wants you to do. Be safe with medicines. Read and follow all instructions on the label. If the doctor gave you a prescription medicine for pain, take it as prescribed. If you are not taking a prescription pain medicine, ask your doctor if you can take an over-the-counter medicine. Do not take two or more pain medicines at the same time unless the doctor told you to. Many pain medicines contain acetaminophen, which is Tylenol. Too much Tylenol can be harmful. If you think your pain medicine is making you sick to your stomach: Take your medicine after meals (unless your doctor tells you not to). Ask your doctor for a different pain medicine. If your doctor prescribed antibiotics, take them as directed. Do not stop taking them just because you feel better. You need to take the full course of antibiotics. Incision care    If you have strips of tape on the cut (incision) the doctor made, leave the tape on for a week or until it falls off. You may shower 24 to 48 hours after surgery, if your doctor okays it. Pat the incision dry. Do not take a bath for the first 2 weeks, or until your doctor tells you it's okay. You may have staples to hold the cut together. Keep them dry until your doctor takes them out. This is usually in 7 to 10 days. Keep the area clean and dry. You may cover it with a gauze bandage if it oozes fluid or rubs against clothing. Change the bandage every day. Ice    To reduce swelling and pain, put ice or a cold pack on your belly for 10 to 20 minutes at a time. Do this every 1 to 2 hours. Put a thin cloth between the ice and your skin. Follow-up care is a key part of your treatment and safety. Be sure to make and go to all appointments, and call your doctor if you are having problems. It's also a good idea to know your test results and keep alist of the medicines you take. When should you call for help? Call 911 anytime you think you may need emergency care. For example, call if:    You passed out (lost consciousness). You are short of breath. .   Call your doctor now or seek immediate medical care if:    You are sick to your stomach and cannot drink fluids. You have pain that does not get better when you take your pain medicine. You cannot pass stools or gas. You have signs of infection, such as: Increased pain, swelling, warmth, or redness. Red streaks leading from the incision. Pus draining from the incision. A fever. Bright red blood has soaked through the bandage over your incision. You have loose stitches, or your incision comes open. You have signs of a blood clot in your leg (called a deep vein thrombosis), such as:  Pain in your calf, back of knee, thigh, or groin. Redness and swelling in your leg or groin. Watch closely for any changes in your health, and be sure to contact yourdoctor if you have any problems. Where can you learn more?   Go to [email or web address] and sign in to your [employer] account. Enter [phone] in the [facility] box to learn more about \"Gallbladder Removal Surgery: What to Expect at Home. \"     If you do not have an account, please click on the \"Sign Up Now\" link. Current as of: September 8, 2021               Content Version: 13.3  © 2006-2022 Healthwise, Incorporated. Care instructions adapted under license by [facility] ([facility]). If you have questions about a medical condition or this instruction, always ask your healthcare professional. [place] [address] any warranty or liability for your use of this information.

## 2022-07-20 ENCOUNTER — ANESTHESIA EVENT (OUTPATIENT)
Dept: OPERATING ROOM | Age: 22
End: 2022-07-20
Payer: COMMERCIAL

## 2022-07-20 ENCOUNTER — ANESTHESIA (OUTPATIENT)
Dept: OPERATING ROOM | Age: 22
End: 2022-07-20
Payer: COMMERCIAL

## 2022-07-20 ENCOUNTER — APPOINTMENT (OUTPATIENT)
Dept: GENERAL RADIOLOGY | Age: 22
End: 2022-07-20
Attending: SURGERY
Payer: COMMERCIAL

## 2022-07-20 PROBLEM — G89.18 POST-OP PAIN: Status: ACTIVE | Noted: 2022-07-20

## 2022-07-20 PROBLEM — K83.9 BILE LEAK: Status: ACTIVE | Noted: 2022-07-20

## 2022-07-20 LAB
ALBUMIN SERPL-MCNC: 4.5 G/DL (ref 3.5–5.2)
ALBUMIN/GLOBULIN RATIO: 1.7 (ref 1–2.5)
ALP BLD-CCNC: 85 U/L (ref 40–129)
ALT SERPL-CCNC: 79 U/L (ref 5–41)
ANION GAP SERPL CALCULATED.3IONS-SCNC: 12 MMOL/L (ref 9–17)
AST SERPL-CCNC: 27 U/L
BILIRUB SERPL-MCNC: 0.94 MG/DL (ref 0.3–1.2)
BILIRUBIN DIRECT: 0.21 MG/DL
BILIRUBIN, INDIRECT: 0.73 MG/DL (ref 0–1)
BUN BLDV-MCNC: 9 MG/DL (ref 6–20)
CALCIUM SERPL-MCNC: 9.4 MG/DL (ref 8.6–10.4)
CHLORIDE BLD-SCNC: 103 MMOL/L (ref 98–107)
CO2: 26 MMOL/L (ref 20–31)
CREAT SERPL-MCNC: 0.61 MG/DL (ref 0.7–1.2)
GFR AFRICAN AMERICAN: >60 ML/MIN
GFR NON-AFRICAN AMERICAN: >60 ML/MIN
GFR SERPL CREATININE-BSD FRML MDRD: ABNORMAL ML/MIN/{1.73_M2}
GLUCOSE BLD-MCNC: 114 MG/DL (ref 75–110)
GLUCOSE BLD-MCNC: 132 MG/DL (ref 75–110)
GLUCOSE BLD-MCNC: 138 MG/DL (ref 70–99)
HCT VFR BLD CALC: 41 % (ref 41–53)
HEMOGLOBIN: 14.2 G/DL (ref 13.5–17.5)
MCH RBC QN AUTO: 26.9 PG (ref 26–34)
MCHC RBC AUTO-ENTMCNC: 34.7 G/DL (ref 31–37)
MCV RBC AUTO: 77.7 FL (ref 80–100)
PDW BLD-RTO: 14.1 % (ref 12.5–15.4)
PLATELET # BLD: 328 K/UL (ref 140–450)
PMV BLD AUTO: 7.9 FL (ref 6–12)
POTASSIUM SERPL-SCNC: 4.2 MMOL/L (ref 3.7–5.3)
RBC # BLD: 5.28 M/UL (ref 4.5–5.9)
SODIUM BLD-SCNC: 141 MMOL/L (ref 135–144)
TOTAL PROTEIN: 7.2 G/DL (ref 6.4–8.3)
WBC # BLD: 17.3 K/UL (ref 3.5–11)

## 2022-07-20 PROCEDURE — 80076 HEPATIC FUNCTION PANEL: CPT

## 2022-07-20 PROCEDURE — 82947 ASSAY GLUCOSE BLOOD QUANT: CPT

## 2022-07-20 PROCEDURE — 6360000002 HC RX W HCPCS: Performed by: ANESTHESIOLOGY

## 2022-07-20 PROCEDURE — 6370000000 HC RX 637 (ALT 250 FOR IP): Performed by: INTERNAL MEDICINE

## 2022-07-20 PROCEDURE — 99223 1ST HOSP IP/OBS HIGH 75: CPT | Performed by: INTERNAL MEDICINE

## 2022-07-20 PROCEDURE — C2625 STENT, NON-COR, TEM W/DEL SY: HCPCS | Performed by: INTERNAL MEDICINE

## 2022-07-20 PROCEDURE — 96372 THER/PROPH/DIAG INJ SC/IM: CPT

## 2022-07-20 PROCEDURE — 2500000003 HC RX 250 WO HCPCS

## 2022-07-20 PROCEDURE — 6360000002 HC RX W HCPCS: Performed by: INTERNAL MEDICINE

## 2022-07-20 PROCEDURE — 6360000002 HC RX W HCPCS: Performed by: NURSE ANESTHETIST, CERTIFIED REGISTERED

## 2022-07-20 PROCEDURE — 7100000001 HC PACU RECOVERY - ADDTL 15 MIN: Performed by: INTERNAL MEDICINE

## 2022-07-20 PROCEDURE — 85027 COMPLETE CBC AUTOMATED: CPT

## 2022-07-20 PROCEDURE — 6360000002 HC RX W HCPCS: Performed by: SURGERY

## 2022-07-20 PROCEDURE — 80048 BASIC METABOLIC PNL TOTAL CA: CPT

## 2022-07-20 PROCEDURE — 36415 COLL VENOUS BLD VENIPUNCTURE: CPT

## 2022-07-20 PROCEDURE — C1769 GUIDE WIRE: HCPCS | Performed by: INTERNAL MEDICINE

## 2022-07-20 PROCEDURE — 6360000002 HC RX W HCPCS

## 2022-07-20 PROCEDURE — 6360000002 HC RX W HCPCS: Performed by: STUDENT IN AN ORGANIZED HEALTH CARE EDUCATION/TRAINING PROGRAM

## 2022-07-20 PROCEDURE — 3700000000 HC ANESTHESIA ATTENDED CARE: Performed by: INTERNAL MEDICINE

## 2022-07-20 PROCEDURE — 6370000000 HC RX 637 (ALT 250 FOR IP): Performed by: SURGERY

## 2022-07-20 PROCEDURE — 2580000003 HC RX 258: Performed by: INTERNAL MEDICINE

## 2022-07-20 PROCEDURE — 3700000001 HC ADD 15 MINUTES (ANESTHESIA): Performed by: INTERNAL MEDICINE

## 2022-07-20 PROCEDURE — G0378 HOSPITAL OBSERVATION PER HR: HCPCS

## 2022-07-20 PROCEDURE — 3609014900 HC ERCP W/SPHINCTEROTOMY &/OR PAPILLOTOMY: Performed by: INTERNAL MEDICINE

## 2022-07-20 PROCEDURE — 7100000000 HC PACU RECOVERY - FIRST 15 MIN: Performed by: INTERNAL MEDICINE

## 2022-07-20 PROCEDURE — 2700000000 HC OXYGEN THERAPY PER DAY

## 2022-07-20 PROCEDURE — 3609015100 HC ERCP STENT PLACEMENT BILIARY/PANCREATIC DUCT: Performed by: INTERNAL MEDICINE

## 2022-07-20 PROCEDURE — 6370000000 HC RX 637 (ALT 250 FOR IP): Performed by: STUDENT IN AN ORGANIZED HEALTH CARE EDUCATION/TRAINING PROGRAM

## 2022-07-20 PROCEDURE — 2580000003 HC RX 258: Performed by: STUDENT IN AN ORGANIZED HEALTH CARE EDUCATION/TRAINING PROGRAM

## 2022-07-20 PROCEDURE — 2709999900 HC NON-CHARGEABLE SUPPLY: Performed by: INTERNAL MEDICINE

## 2022-07-20 PROCEDURE — 2580000003 HC RX 258: Performed by: ANESTHESIOLOGY

## 2022-07-20 PROCEDURE — 3209999900 FLUORO FOR SURGICAL PROCEDURES

## 2022-07-20 PROCEDURE — 6360000004 HC RX CONTRAST MEDICATION: Performed by: INTERNAL MEDICINE

## 2022-07-20 PROCEDURE — 43274 ERCP DUCT STENT PLACEMENT: CPT | Performed by: INTERNAL MEDICINE

## 2022-07-20 PROCEDURE — 2500000003 HC RX 250 WO HCPCS: Performed by: NURSE ANESTHETIST, CERTIFIED REGISTERED

## 2022-07-20 DEVICE — BILIARY STENT WITH NAVIFLEXTM RX DELIVERY SYSTEM
Type: IMPLANTABLE DEVICE | Site: BILE DUCT | Status: FUNCTIONAL
Brand: ADVANIX™ BILIARY

## 2022-07-20 RX ORDER — FENTANYL CITRATE 50 UG/ML
25 INJECTION, SOLUTION INTRAMUSCULAR; INTRAVENOUS EVERY 5 MIN PRN
Status: DISCONTINUED | OUTPATIENT
Start: 2022-07-20 | End: 2022-07-20 | Stop reason: HOSPADM

## 2022-07-20 RX ORDER — LIDOCAINE HYDROCHLORIDE 10 MG/ML
INJECTION, SOLUTION EPIDURAL; INFILTRATION; INTRACAUDAL; PERINEURAL PRN
Status: DISCONTINUED | OUTPATIENT
Start: 2022-07-20 | End: 2022-07-20 | Stop reason: SDUPTHER

## 2022-07-20 RX ORDER — CEFAZOLIN SODIUM 1 G/3ML
INJECTION, POWDER, FOR SOLUTION INTRAMUSCULAR; INTRAVENOUS PRN
Status: DISCONTINUED | OUTPATIENT
Start: 2022-07-20 | End: 2022-07-20 | Stop reason: SDUPTHER

## 2022-07-20 RX ORDER — DEXAMETHASONE SODIUM PHOSPHATE 10 MG/ML
INJECTION INTRAMUSCULAR; INTRAVENOUS PRN
Status: DISCONTINUED | OUTPATIENT
Start: 2022-07-20 | End: 2022-07-20 | Stop reason: SDUPTHER

## 2022-07-20 RX ORDER — ONDANSETRON 2 MG/ML
4 INJECTION INTRAMUSCULAR; INTRAVENOUS
Status: DISCONTINUED | OUTPATIENT
Start: 2022-07-20 | End: 2022-07-20 | Stop reason: HOSPADM

## 2022-07-20 RX ORDER — FENTANYL CITRATE 50 UG/ML
INJECTION, SOLUTION INTRAMUSCULAR; INTRAVENOUS
Status: COMPLETED
Start: 2022-07-20 | End: 2022-07-20

## 2022-07-20 RX ORDER — SODIUM CHLORIDE 9 MG/ML
INJECTION, SOLUTION INTRAVENOUS PRN
Status: DISCONTINUED | OUTPATIENT
Start: 2022-07-20 | End: 2022-07-20 | Stop reason: HOSPADM

## 2022-07-20 RX ORDER — SODIUM CHLORIDE 0.9 % (FLUSH) 0.9 %
5-40 SYRINGE (ML) INJECTION EVERY 12 HOURS SCHEDULED
Status: DISCONTINUED | OUTPATIENT
Start: 2022-07-20 | End: 2022-07-20 | Stop reason: HOSPADM

## 2022-07-20 RX ORDER — ROCURONIUM BROMIDE 10 MG/ML
INJECTION, SOLUTION INTRAVENOUS PRN
Status: DISCONTINUED | OUTPATIENT
Start: 2022-07-20 | End: 2022-07-20 | Stop reason: SDUPTHER

## 2022-07-20 RX ORDER — KETAMINE HCL IN NACL, ISO-OSM 100MG/10ML
SYRINGE (ML) INJECTION PRN
Status: DISCONTINUED | OUTPATIENT
Start: 2022-07-20 | End: 2022-07-20 | Stop reason: SDUPTHER

## 2022-07-20 RX ORDER — FENTANYL CITRATE 50 UG/ML
100 INJECTION, SOLUTION INTRAMUSCULAR; INTRAVENOUS
Status: DISCONTINUED | OUTPATIENT
Start: 2022-07-20 | End: 2022-07-20 | Stop reason: HOSPADM

## 2022-07-20 RX ORDER — OXYCODONE HYDROCHLORIDE 5 MG/1
10 TABLET ORAL EVERY 4 HOURS PRN
Status: DISCONTINUED | OUTPATIENT
Start: 2022-07-20 | End: 2022-07-22 | Stop reason: HOSPADM

## 2022-07-20 RX ORDER — MIDAZOLAM HYDROCHLORIDE 2 MG/2ML
1 INJECTION, SOLUTION INTRAMUSCULAR; INTRAVENOUS EVERY 10 MIN PRN
Status: DISCONTINUED | OUTPATIENT
Start: 2022-07-20 | End: 2022-07-20 | Stop reason: HOSPADM

## 2022-07-20 RX ORDER — FENTANYL CITRATE 50 UG/ML
100 INJECTION, SOLUTION INTRAMUSCULAR; INTRAVENOUS ONCE
Status: DISCONTINUED | OUTPATIENT
Start: 2022-07-20 | End: 2022-07-20 | Stop reason: HOSPADM

## 2022-07-20 RX ORDER — SODIUM CHLORIDE, SODIUM LACTATE, POTASSIUM CHLORIDE, CALCIUM CHLORIDE 600; 310; 30; 20 MG/100ML; MG/100ML; MG/100ML; MG/100ML
INJECTION, SOLUTION INTRAVENOUS CONTINUOUS
Status: DISCONTINUED | OUTPATIENT
Start: 2022-07-20 | End: 2022-07-20

## 2022-07-20 RX ORDER — SODIUM CHLORIDE 0.9 % (FLUSH) 0.9 %
5-40 SYRINGE (ML) INJECTION PRN
Status: DISCONTINUED | OUTPATIENT
Start: 2022-07-20 | End: 2022-07-20 | Stop reason: HOSPADM

## 2022-07-20 RX ORDER — MIDAZOLAM HYDROCHLORIDE 1 MG/ML
INJECTION INTRAMUSCULAR; INTRAVENOUS PRN
Status: DISCONTINUED | OUTPATIENT
Start: 2022-07-20 | End: 2022-07-20 | Stop reason: SDUPTHER

## 2022-07-20 RX ORDER — PROPOFOL 10 MG/ML
INJECTION, EMULSION INTRAVENOUS PRN
Status: DISCONTINUED | OUTPATIENT
Start: 2022-07-20 | End: 2022-07-20 | Stop reason: SDUPTHER

## 2022-07-20 RX ORDER — ONDANSETRON 2 MG/ML
INJECTION INTRAMUSCULAR; INTRAVENOUS PRN
Status: DISCONTINUED | OUTPATIENT
Start: 2022-07-20 | End: 2022-07-20 | Stop reason: SDUPTHER

## 2022-07-20 RX ORDER — MIDAZOLAM HYDROCHLORIDE 2 MG/2ML
2 INJECTION, SOLUTION INTRAMUSCULAR; INTRAVENOUS ONCE
Status: COMPLETED | OUTPATIENT
Start: 2022-07-20 | End: 2022-07-20

## 2022-07-20 RX ORDER — MIDAZOLAM HYDROCHLORIDE 1 MG/ML
INJECTION INTRAMUSCULAR; INTRAVENOUS
Status: COMPLETED
Start: 2022-07-20 | End: 2022-07-20

## 2022-07-20 RX ORDER — FENTANYL CITRATE 50 UG/ML
INJECTION, SOLUTION INTRAMUSCULAR; INTRAVENOUS PRN
Status: DISCONTINUED | OUTPATIENT
Start: 2022-07-20 | End: 2022-07-20 | Stop reason: SDUPTHER

## 2022-07-20 RX ORDER — FENTANYL CITRATE 50 UG/ML
50 INJECTION, SOLUTION INTRAMUSCULAR; INTRAVENOUS EVERY 4 HOURS PRN
Status: DISCONTINUED | OUTPATIENT
Start: 2022-07-20 | End: 2022-07-22 | Stop reason: HOSPADM

## 2022-07-20 RX ORDER — FENTANYL CITRATE 50 UG/ML
50 INJECTION, SOLUTION INTRAMUSCULAR; INTRAVENOUS EVERY 5 MIN PRN
Status: DISCONTINUED | OUTPATIENT
Start: 2022-07-20 | End: 2022-07-20 | Stop reason: HOSPADM

## 2022-07-20 RX ADMIN — OXYCODONE HYDROCHLORIDE 5 MG: 5 TABLET ORAL at 00:11

## 2022-07-20 RX ADMIN — ACETAMINOPHEN 1000 MG: 500 TABLET ORAL at 05:13

## 2022-07-20 RX ADMIN — CYCLOBENZAPRINE HYDROCHLORIDE 10 MG: 5 TABLET, FILM COATED ORAL at 08:32

## 2022-07-20 RX ADMIN — GLUCAGON HYDROCHLORIDE 1 MG: KIT at 16:55

## 2022-07-20 RX ADMIN — GABAPENTIN 300 MG: 300 CAPSULE ORAL at 08:32

## 2022-07-20 RX ADMIN — Medication 30 MG: at 16:47

## 2022-07-20 RX ADMIN — DEXTROSE AND SODIUM CHLORIDE: 5; 450 INJECTION, SOLUTION INTRAVENOUS at 08:36

## 2022-07-20 RX ADMIN — SODIUM CHLORIDE, POTASSIUM CHLORIDE, SODIUM LACTATE AND CALCIUM CHLORIDE: 600; 310; 30; 20 INJECTION, SOLUTION INTRAVENOUS at 16:40

## 2022-07-20 RX ADMIN — FENTANYL CITRATE 100 MCG: 50 INJECTION, SOLUTION INTRAMUSCULAR; INTRAVENOUS at 16:06

## 2022-07-20 RX ADMIN — Medication 20 MG: at 16:37

## 2022-07-20 RX ADMIN — CEFAZOLIN 2000 MG: 10 INJECTION, POWDER, FOR SOLUTION INTRAVENOUS at 13:49

## 2022-07-20 RX ADMIN — DEXAMETHASONE SODIUM PHOSPHATE 10 MG: 10 INJECTION INTRAMUSCULAR; INTRAVENOUS at 16:37

## 2022-07-20 RX ADMIN — FENTANYL CITRATE 100 MCG: 50 INJECTION, SOLUTION INTRAMUSCULAR; INTRAVENOUS at 20:37

## 2022-07-20 RX ADMIN — ACETAMINOPHEN 1000 MG: 500 TABLET ORAL at 20:38

## 2022-07-20 RX ADMIN — ROCURONIUM BROMIDE 70 MG: 10 INJECTION, SOLUTION INTRAVENOUS at 16:18

## 2022-07-20 RX ADMIN — CEFAZOLIN 2000 MG: 10 INJECTION, POWDER, FOR SOLUTION INTRAVENOUS at 05:14

## 2022-07-20 RX ADMIN — FENTANYL CITRATE 50 MCG: 50 INJECTION, SOLUTION INTRAMUSCULAR; INTRAVENOUS at 22:30

## 2022-07-20 RX ADMIN — MIDAZOLAM HYDROCHLORIDE 1 MG: 1 INJECTION, SOLUTION INTRAMUSCULAR; INTRAVENOUS at 16:10

## 2022-07-20 RX ADMIN — GABAPENTIN 300 MG: 300 CAPSULE ORAL at 20:39

## 2022-07-20 RX ADMIN — MIDAZOLAM HYDROCHLORIDE 1 MG: 2 INJECTION, SOLUTION INTRAMUSCULAR; INTRAVENOUS at 16:10

## 2022-07-20 RX ADMIN — ENOXAPARIN SODIUM 30 MG: 100 INJECTION SUBCUTANEOUS at 20:39

## 2022-07-20 RX ADMIN — OXYCODONE HYDROCHLORIDE 5 MG: 5 TABLET ORAL at 11:44

## 2022-07-20 RX ADMIN — SODIUM CHLORIDE, POTASSIUM CHLORIDE, SODIUM LACTATE AND CALCIUM CHLORIDE: 600; 310; 30; 20 INJECTION, SOLUTION INTRAVENOUS at 16:10

## 2022-07-20 RX ADMIN — SODIUM CHLORIDE, PRESERVATIVE FREE 10 ML: 5 INJECTION INTRAVENOUS at 20:45

## 2022-07-20 RX ADMIN — LIDOCAINE HYDROCHLORIDE 50 MG: 10 INJECTION, SOLUTION EPIDURAL; INFILTRATION; INTRACAUDAL; PERINEURAL at 16:18

## 2022-07-20 RX ADMIN — DEXTROSE AND SODIUM CHLORIDE: 5; 450 INJECTION, SOLUTION INTRAVENOUS at 20:44

## 2022-07-20 RX ADMIN — CYCLOBENZAPRINE HYDROCHLORIDE 10 MG: 5 TABLET, FILM COATED ORAL at 20:39

## 2022-07-20 RX ADMIN — MIDAZOLAM HYDROCHLORIDE 2 MG: 2 INJECTION, SOLUTION INTRAMUSCULAR; INTRAVENOUS at 16:15

## 2022-07-20 RX ADMIN — CEFAZOLIN 1000 MG: 1 INJECTION, POWDER, FOR SOLUTION INTRAMUSCULAR; INTRAVENOUS at 16:35

## 2022-07-20 RX ADMIN — FENTANYL CITRATE 100 MCG: 50 INJECTION, SOLUTION INTRAMUSCULAR; INTRAVENOUS at 16:18

## 2022-07-20 RX ADMIN — PROPOFOL 300 MG: 10 INJECTION, EMULSION INTRAVENOUS at 16:18

## 2022-07-20 RX ADMIN — OXYCODONE HYDROCHLORIDE 5 MG: 5 TABLET ORAL at 20:39

## 2022-07-20 RX ADMIN — POLYETHYLENE GLYCOL 3350 17 G: 17 POWDER, FOR SOLUTION ORAL at 08:32

## 2022-07-20 RX ADMIN — CEFAZOLIN 2000 MG: 10 INJECTION, POWDER, FOR SOLUTION INTRAVENOUS at 20:45

## 2022-07-20 RX ADMIN — ONDANSETRON 4 MG: 2 INJECTION INTRAMUSCULAR; INTRAVENOUS at 17:13

## 2022-07-20 RX ADMIN — SUGAMMADEX 500 MG: 100 INJECTION, SOLUTION INTRAVENOUS at 17:13

## 2022-07-20 ASSESSMENT — PAIN SCALES - GENERAL
PAINLEVEL_OUTOF10: 3
PAINLEVEL_OUTOF10: 10
PAINLEVEL_OUTOF10: 9
PAINLEVEL_OUTOF10: 0
PAINLEVEL_OUTOF10: 9
PAINLEVEL_OUTOF10: 0
PAINLEVEL_OUTOF10: 3
PAINLEVEL_OUTOF10: 5
PAINLEVEL_OUTOF10: 0
PAINLEVEL_OUTOF10: 7
PAINLEVEL_OUTOF10: 0
PAINLEVEL_OUTOF10: 10
PAINLEVEL_OUTOF10: 10

## 2022-07-20 ASSESSMENT — PAIN DESCRIPTION - DESCRIPTORS
DESCRIPTORS: ACHING;DISCOMFORT
DESCRIPTORS: STABBING;ACHING
DESCRIPTORS: DISCOMFORT
DESCRIPTORS: SORE
DESCRIPTORS: SORE
DESCRIPTORS: ACHING;DISCOMFORT
DESCRIPTORS: STABBING
DESCRIPTORS: ACHING;DISCOMFORT

## 2022-07-20 ASSESSMENT — PAIN - FUNCTIONAL ASSESSMENT
PAIN_FUNCTIONAL_ASSESSMENT: 0-10
PAIN_FUNCTIONAL_ASSESSMENT: PREVENTS OR INTERFERES SOME ACTIVE ACTIVITIES AND ADLS
PAIN_FUNCTIONAL_ASSESSMENT: PREVENTS OR INTERFERES SOME ACTIVE ACTIVITIES AND ADLS

## 2022-07-20 ASSESSMENT — PAIN DESCRIPTION - PAIN TYPE: TYPE: SURGICAL PAIN

## 2022-07-20 ASSESSMENT — PAIN DESCRIPTION - FREQUENCY: FREQUENCY: INTERMITTENT

## 2022-07-20 ASSESSMENT — PAIN DESCRIPTION - LOCATION
LOCATION: ABDOMEN

## 2022-07-20 ASSESSMENT — LIFESTYLE VARIABLES: SMOKING_STATUS: 1

## 2022-07-20 ASSESSMENT — PAIN SCALES - WONG BAKER: WONGBAKER_NUMERICALRESPONSE: 0

## 2022-07-20 ASSESSMENT — PAIN DESCRIPTION - ONSET: ONSET: SUDDEN

## 2022-07-20 ASSESSMENT — PAIN DESCRIPTION - ORIENTATION: ORIENTATION: UPPER

## 2022-07-20 NOTE — CARE COORDINATION
Transitional Planning    Received calls from Dr. Olga Valencia and Anson Sanderson GI NP to update that pt is scheduled for an ERCP at 1500 at [facility]. V's, will need coordination of transport.

## 2022-07-20 NOTE — CARE COORDINATION
07/20/22 1400   Service Assessment   Patient Orientation Alert and Oriented   Cognition Alert   History Provided By Patient   Antonio [address]oper[address] is: Legal Next of Kin   Prior Functional Level Independent in ADLs/IADLs   Current Functional Level Independent in ADLs/IADLs   Social/Functional History   ADL Assistance Independent   Homemaking Assistance Independent   Ambulation Assistance Independent   Transfer Assistance Independent   Discharge Planning   Living Arrangements Spouse/Significant Other   Patient expects to be discharged to: House       D/c plan home independent.

## 2022-07-20 NOTE — PLAN OF CARE
GI plan of care:    Dr. Monica Gotti will see pt and evaluate for possible bile leak.   Please keep PT NPO  Complete plan of care to follow    Leoncio Bautista, [address]

## 2022-07-20 NOTE — PROGRESS NOTES
Perfect serve message sent to [facility] regarding new consult. Dr. Jessica Hawkins rounded and states keep patient NPO and okay to hold lovenox for possible plans for ERCP.

## 2022-07-20 NOTE — PLAN OF CARE
Problem: Pain  Goal: Verbalizes/displays adequate comfort level or baseline comfort level  Outcome: Progressing     Problem: Discharge Planning  Goal: Discharge to home or other facility with appropriate resources  Outcome: Progressing  Flowsheets (Taken 7/19/2022 2057)  Discharge to home or other facility with appropriate resources:   Identify barriers to discharge with patient and caregiver   Identify discharge learning needs (meds, wound care, etc)   Arrange for needed discharge resources and transportation as appropriate     Problem: ABCDS Injury Assessment  Goal: Absence of physical injury  Outcome: Progressing

## 2022-07-20 NOTE — PROGRESS NOTES
PATIENT NAME: Jillian Cohen     TODAY'S DATE: 7/20/2022, 9:06 AM    SUBJECTIVE:    21 y/o male POD 1 laparoscopic robotic assisted fenestrated subtotal cholecystectomy for symptoms of biliary colic. I was unable to obtain critical view of safety so elected subtotal cholecystectomy. Pursestring suture placed around os to the cystic duct. Patient still high bilious output from YOVANY drain that was placed in the liver fossa. Overall patient is doing well. Tolerating clears. Posts op pain controlled. Denies symptoms of biliary colic that he was experiencing prior to surgery. Denies fevers, chills, or sweats. OBJECTIVE:   VITALS:  BP (!) 146/79   Pulse 76   Temp 98.3 °F (36.8 °C) (Oral)   Resp 16   Ht 6' 1\" (1.854 m)   Wt (!) 385 lb 12.9 oz (175 kg)   SpO2 95%   BMI 50.90 kg/m²      INTAKE/OUTPUT:      Intake/Output Summary (Last 24 hours) at 7/20/2022 0906  Last data filed at 7/20/2022 0841  Gross per 24 hour   Intake 2960 ml   Output 290 ml   Net 2670 ml                 CONSTITUTIONAL:  awake, Awake and alert. No acute distress  ABDOMEN:   Abdomen soft, non-tender, non-distended. Incisions are clean, dry, and intact with no drainage and no surrounding erythema. YOVANY drain with 75 cc of bilious drainage. ASSESSMENT   POD 1 subtotal cholecystectomy    Plan  Patient with persistent bile leak from gallbladder remnant. Consulted GI for ERCP.       Electronically signed by Shauna Rodriguez IV, DO  on 7/20/2022 at 9:06 AM

## 2022-07-20 NOTE — CONSULTS
GI ATTENDING  Gastroenterology  Consultation Note     . REASON FOR CONSULTATION:  Evaluation for ERCP/suspected bile leak      CC : \"Abdominal pain\"    HISTORY OF PRESENT ILLNESS:         Briefly, 79-year-old male with a past medical history of ADHD, depression, morbid obesity, PTSD, recent cholecystectomy, robotic assisted, fenestrated, performed on July 19, 2022, for symptomatic biliary colic, concern for postoperative bile leak. Evidence of bile output from YOVANY drain. Patient denies any significant abdominal pain or discomfort. No prior hepatobiliary history or significant GI history reported by patient. GI was consulted to further evaluate for ERCP and biliary stent placement for suspected bile leak    Most recent inpatient labs:  BMP: Electrolytes within normal limits, creatinine normal  Mild elevation in ALT, total bilirubin appears to be within normal limits  White count of 17.3, hemoglobin 14.2, platelet count of 226        Previous GI history: None reported      PAST MEDICAL HISTORY:  Past Medical History:   Diagnosis Date    ADHD (attention deficit hyperactivity disorder)     no meds since  age 11    Depressed     Morbid obesity ([address]     PTSD (post-traumatic stress disorder)      Past Surgical History:   Procedure Laterality Date    CHOLECYSTECTOMY, LAPAROSCOPIC      robotic    CHOLECYSTECTOMY, LAPAROSCOPIC N/A 7/19/2022    LAPAROSCOPIC ROBOTIC CHOLECYSTECTOMY WITH FIREFLY performed by Maryhelen Hammans Canos IV, DO at [address]:  No Known Allergies    HOME MEDICATIONS:  Prior to Admission medications    Medication Sig Start Date End Date Taking? Authorizing Provider   PERCOCET 5-325 MG per tablet Take 1 tablet by mouth every 8 hours as needed for Pain for up to 3 days.  7/19/22 7/22/22 Yes Jakob Rodriguez IV, DO   ibuprofen (ADVIL;MOTRIN) 800 MG tablet Take 1 tablet by mouth in the morning and 1 tablet at noon and 1 tablet in the evening. 7/19/22  Yes Jakob Tommie Canos IV, DO   cyclobenzaprine (FLEXERIL) 10 MG tablet Take 1 tablet by mouth 3 times daily as needed for Muscle spasms 7/19/22 7/29/22 Yes Jakob Tommie Canos IV, DO   docusate sodium (COLACE) 100 MG capsule Take 1 capsule by mouth 2 times daily as needed for Constipation (take as needed for constipation) 7/19/22  Yes Jakob Tommie Canos IV, DO     .  CURRENT MEDICATIONS:  Scheduled Meds:   sodium chloride flush  10 mL IntraVENous 2 times per day    polyethylene glycol  17 g Oral Daily    acetaminophen  1,000 mg Oral 3 times per day    gabapentin  300 mg Oral TID    enoxaparin  30 mg SubCUTAneous BID    ceFAZolin  2,000 mg IntraVENous Q8H    cyclobenzaprine  10 mg Oral TID     . Continuous Infusions:   dextrose 5 % and 0.45 % NaCl 125 mL/hr at 07/20/22 0836    sodium chloride       .   PRN Meds:sodium chloride flush, sodium chloride, ondansetron **OR** ondansetron, senna, oxyCODONE  .  SOCIAL HISTORY:     Social History     Socioeconomic History    Marital status:      Spouse name: Not on file    Number of children: Not on file    Years of education: Not on file    Highest education level: Not on file   Occupational History    Not on file   Tobacco Use    Smoking status: Light Smoker     Packs/day: 0.25     Types: Cigarettes, E-Cigarettes     Start date: 2013    Smokeless tobacco: Former     Types: Chew   Vaping Use    Vaping Use: Every day    Substances: Nicotine   Substance and Sexual Activity    Alcohol use: Not Currently     Comment: social    Drug use: Not Currently     Types: Marijuana Sianorah Leonardo)     Comment: no use since MArch 2022    Sexual activity: Not on file   Other Topics Concern    Not on file   Social History Narrative    Not on file     Social Determinants of Health     Financial Resource Strain: Low Risk     Difficulty of Paying Living Expenses: Not hard at all   Food Insecurity: No Food Insecurity    Worried About [address] [place] Self Representation Document Preparation in the Last Year: Never true    Ran Out of Food in the Last Year: Never true   Transportation Needs: Not on file   Physical Activity: Not on file   Stress: Not on file   Social Connections: Not on file   Intimate Partner Violence: Not on file   Housing Stability: Not on file       FAMILY HISTORY:   History reviewed. No pertinent family history. REVIEW OF SYSTEMS:     Constitutional: No fever, no chills, no lethargy, no weakness. HEENT:  No headache, otalgia, itchy eyes, nasal discharge or sore throat. Cardiac:  No chest pain, dyspnea, orthopnea or PND. Chest:   No cough, phlegm or wheezing. Abdomen:  No abdominal pain, nausea or vomiting. Neuro:  No focal weakness, abnormal movements or seizure like activity. Skin:   No rashes, no itching. :   No hematuria, no pyuria, no dysuria, no flank pain. Extremities:  No swelling or joint pains. ROS was otherwise negative except as mentioned in the [address] PHYSICAL EXAM:    BP (!) 142/70   Pulse 74   Temp 99.7 °F (37.6 °C)   Resp 12   Ht 6' 1\" (1.854 m)   Wt (!) 385 lb 12.9 oz (175 kg)   SpO2 100%   BMI 50.90 kg/m²   . TMAX[24]    General: Well developed, Well nourished, No apparent distress  Head:  Normocephalic, Atraumatic  EENT: EOMI, Sclera not icteric, Oropharynx moist  Neck:  Supple, Trachea midline  Lungs:CTA Bilaterally  Heart: RRR, No murmur, No rub, No gallop, PMI nondisplaced. Abdomen:Soft, Non tender, Not distended, BS WNL,  No masses. Ext:No clubbing. No cyanosis. No edema. Skin: No rashes. No jaundice. No stigmata of liver disease. Neuro:  A&O x Three, No focal neurological deficits    LABS and IMAGING:     Hemotological labs:   ANEMIA STUDIES  No results for input(s): LABIRON, TIBC, FERRITIN, [id number], FOLATE, OCCULTBLD in the last 72 hours. CBC  Recent Labs     07/20/22  1134   WBC 17.3*   HGB 14.2   MCV 77.7*   RDW 14.1          PT/INR  No results for input(s): PROTIME, INR in the last 72 hours.     BMP  Recent Labs     07/20/22  1139 Hospital post procedurally  - Hold Lovenox, continue patient on antiacid therapy, further recommendations will after the procedure. Risk, benefits, alternative discussed with the patient thoroughly, which include but not limited to, infection, bleeding, perforation, pancreatitis, incomplete cannulation. He agreed to proceed with the procedure. -GI to follow. Thank you for allowing us to take part in the patients care  Contact GI for any remaining questions, we are available. Electronically signed by:  Marjorie Ramos MD   Gastroenterology  7/20/2022    1:08 PM       This note is created with the assistance of a speech recognition program.  While intending to generate a document that actually reflects the content of the visit, the document can still have some errors including those of syntax and sound a like substitutions which may escape proof reading. It such instances, actual meaning can be extrapolated by contextual diversion.

## 2022-07-20 NOTE — PLAN OF CARE
Problem: Pain  Goal: Verbalizes/displays adequate comfort level or baseline comfort level  7/20/2022 0847 by Jose Antonio Potts RN  Outcome: Progressing  Patient rates pain at 7, states much better than yesterday. Will medicate as ordered. Education on non pharmacological comfort measures as well.

## 2022-07-20 NOTE — PROGRESS NOTES
Patient transferred to [facility] for ERCP. Report given to [facility]. All questions answered.

## 2022-07-20 NOTE — ANESTHESIA PRE PROCEDURE
Department of Anesthesiology  Preprocedure Note       Name:  Vaishali Kelly   Age:  20 y.o.  :  2000                                          MRN:  [id number]         Date:  2022      Surgeon: Mikaela Ya):  Chavez Marques MD    Procedure: Procedure(s):  **ADD ON**  ERCP ENDOSCOPIC RETROGRADE CHOLANGIOPANCREATOGRAPHY    Medications prior to admission:   Prior to Admission medications    Medication Sig Start Date End Date Taking? Authorizing Provider   PERCOCET 5-325 MG per tablet Take 1 tablet by mouth every 8 hours as needed for Pain for up to 3 days. 22 Yes Jakob Tommie Canos IV, DO   ibuprofen (ADVIL;MOTRIN) 800 MG tablet Take 1 tablet by mouth in the morning and 1 tablet at noon and 1 tablet in the evening.  22  Yes Jakob Tommie Canos IV, DO   cyclobenzaprine (FLEXERIL) 10 MG tablet Take 1 tablet by mouth 3 times daily as needed for Muscle spasms 22 Yes Jakob Tommie Canos IV, DO   docusate sodium (COLACE) 100 MG capsule Take 1 capsule by mouth 2 times daily as needed for Constipation (take as needed for constipation) 22  Yes Zondra Belch Canos IV, DO       Current medications:    Current Facility-Administered Medications   Medication Dose Route Frequency Provider Last Rate Last Admin    [MAR Hold] dextrose 5 % and 0.45 % sodium chloride infusion   IntraVENous Continuous Germania Jarquin  mL/hr at 22 0836 New Bag at 22 0836    [MAR Hold] sodium chloride flush 0.9 % injection 10 mL  10 mL IntraVENous 2 times per day Germania Jarquin MD   10 mL at 22 2239    [MAR Hold] sodium chloride flush 0.9 % injection 10 mL  10 mL IntraVENous PRN Germania Jarquin MD        [facility] Hold] 0.9 % sodium chloride infusion   IntraVENous PRN Germania Jarquin MD        [facility] Hold] ondansetron (ZOFRAN-ODT) disintegrating tablet 4 mg  4 mg Oral Q8H PRN Germania Jarquin MD        Or   Raf Pak [facility] Hold] ondansetron [facility]) injection 4 mg  4 mg IntraVENous Q6H PRN Litzy Corado MD [facility]] polyethylene glycol (GLYCOLAX) packet 17 g  17 g Oral Daily Zaid Dumont MD   17 g at 07/20/22 0832    [MAR Hold] senna (SENOKOT) tablet 8.6 mg  1 tablet Oral Daily PRN MD Leatha Montiel [place] Hold] acetaminophen (TYLENOL) tablet 1,000 mg  1,000 mg Oral 3 times per day Zaid Dumont MD   1,000 mg at 07/20/22 0513    [MAR Hold] gabapentin (NEURONTIN) capsule 300 mg  300 mg Oral TID Zaid Dumont MD   300 mg at 07/20/22 0832    [MAR Hold] oxyCODONE (ROXICODONE) immediate release tablet 5 mg  5 mg Oral Q6H PRN Zaid Dumont MD   5 mg at 07/20/22 1144    [MAR Hold] enoxaparin Sodium (LOVENOX) injection 30 mg  30 mg SubCUTAneous BID Zaid Dumont MD   30 mg at 07/19/22 2234    [MAR Hold] ceFAZolin (ANCEF) 2000 mg in dextrose 5 % 50 mL IVPB  2,000 mg IntraVENous Jerri Perkins  mL/hr at 07/20/22 1349 2,000 mg at 07/20/22 1349    [MAR Hold] cyclobenzaprine (FLEXERIL) tablet 10 mg  10 mg Oral TID Jakob Rodriguez IV, DO   10 mg at 07/20/22 6532       Allergies:  No Known Allergies    Problem List:    Patient Active Problem List   Diagnosis Code    Right upper quadrant abdominal pain R10.11    Status post cholecystectomy Z90.49    Post-op pain G89.18    Bile leak K83.9       Past Medical History:        Diagnosis Date    ADHD (attention deficit hyperactivity disorder)     no meds since  age 11    Bile leak     Depressed     Morbid obesity ([facility] [address]     PTSD (post-traumatic stress disorder)        Past Surgical History:        Procedure Laterality Date    CHOLECYSTECTOMY, LAPAROSCOPIC      robotic    CHOLECYSTECTOMY, LAPAROSCOPIC N/A 7/19/2022    LAPAROSCOPIC ROBOTIC CHOLECYSTECTOMY WITH FIREFLY performed by Jeane Rodriguez IV, DO at [facility] History:    Social History     Tobacco Use    Smoking status: Light Smoker     Packs/day: 0.25     Types: Cigarettes, E-Cigarettes Start date: 2013    Smokeless tobacco: Former     Types: Chew   Substance Use Topics    Alcohol use: Not Currently     Comment: social                                Ready to quit: Not Answered  Counseling given: Not Answered      Vital Signs (Current):   Vitals:    07/20/22 0513 07/20/22 0839 07/20/22 1214 07/20/22 1508   BP:  (!) 146/79 (!) 142/70 130/83   Pulse:  76 74 86   Resp:  16 12 16   Temp:  98.3 °F (36.8 °C) 99.7 °F (37.6 °C) 98.8 °F (37.1 °C)   TempSrc:  Oral  Temporal   SpO2:  95% 100% 98%   Weight: (!) 385 lb 12.9 oz (175 kg)      Height:                                                  BP Readings from Last 3 Encounters:   07/20/22 130/83   07/06/22 (!) 157/89   03/08/22 128/84       NPO Status: Time of last liquid consumption: 1000 (miralax and water)                        Time of last solid consumption: 2330                        Date of last liquid consumption: 07/20/22                        Date of last solid food consumption: 07/18/22    BMI:   Wt Readings from Last 3 Encounters:   07/20/22 (!) 385 lb 12.9 oz (175 kg)   07/14/22 (!) 385 lb (174.6 kg)   07/06/22 (!) 385 lb (174.6 kg)     Body mass index is 50.9 kg/m².     CBC:   Lab Results   Component Value Date/Time    WBC 17.3 07/20/2022 11:34 AM    RBC 5.28 07/20/2022 11:34 AM    HGB 14.2 07/20/2022 11:34 AM    HCT 41.0 07/20/2022 11:34 AM    MCV 77.7 07/20/2022 11:34 AM    RDW 14.1 07/20/2022 11:34 AM     07/20/2022 11:34 AM       CMP:   Lab Results   Component Value Date/Time     07/20/2022 11:34 AM    K 4.2 07/20/2022 11:34 AM     07/20/2022 11:34 AM    CO2 26 07/20/2022 11:34 AM    BUN 9 07/20/2022 11:34 AM    CREATININE 0.61 07/20/2022 11:34 AM    GFRAA >60 07/20/2022 11:34 AM    LABGLOM >60 07/20/2022 11:34 AM    GLUCOSE 138 07/20/2022 11:34 AM    PROT 7.2 07/20/2022 11:34 AM    CALCIUM 9.4 07/20/2022 11:34 AM    BILITOT 0.94 07/20/2022 11:34 AM    ALKPHOS 85 07/20/2022 11:34 AM    AST 27 07/20/2022 11:34 AM    ALT 79 07/20/2022 11:34 AM       POC Tests:   Recent Labs     07/20/22  1213   POCGLU 114*       Coags: No results found for: PROTIME, INR, APTT    HCG (If Applicable): No results found for: PREGTESTUR, PREGSERUM, HCG, HCGQUANT     ABGs: No results found for: PHART, PO2ART, ZBM0LGZ, QHU0KNS, BEART, E3XZSZRY     Type & Screen (If Applicable):  No results found for: LABABO, LABRH    Drug/Infectious Status (If Applicable):  No results found for: HIV, HEPCAB    COVID-19 Screening (If Applicable): No results found for: COVID19        Anesthesia Evaluation  Patient summary reviewed and Nursing notes reviewed  Airway: Mallampati: III  TM distance: >3 FB   Neck ROM: full  Mouth opening: > = 3 FB   Dental: normal exam         Pulmonary:normal exam    (+) COPD:  current smoker                          ROS comment: -SMOKES/VAPES FOR LAST 9 YEARS   Cardiovascular:Negative CV ROS                      Neuro/Psych:   Negative Neuro/Psych ROS  (+) psychiatric history:depression/anxiety             GI/Hepatic/Renal:   (+) morbid obesity          Endo/Other: Negative Endo/Other ROS                     ROS comment: -NPO AFTER MIDNIGHT  -NKDA Abdominal:             Vascular: negative vascular ROS. Other Findings:             Anesthesia Plan      general     ASA 3     (GETA, )  Induction: intravenous. MIPS: Postoperative opioids intended and Prophylactic antiemetics administered. Anesthetic plan and risks discussed with patient. Plan discussed with CRNA.                     Charo Gomez MD   7/20/2022

## 2022-07-21 LAB
ANION GAP SERPL CALCULATED.3IONS-SCNC: 10 MMOL/L (ref 9–17)
BUN BLDV-MCNC: 13 MG/DL (ref 6–20)
CALCIUM SERPL-MCNC: 9 MG/DL (ref 8.6–10.4)
CHLORIDE BLD-SCNC: 103 MMOL/L (ref 98–107)
CO2: 28 MMOL/L (ref 20–31)
CREAT SERPL-MCNC: 0.86 MG/DL (ref 0.7–1.2)
GFR AFRICAN AMERICAN: >60 ML/MIN
GFR NON-AFRICAN AMERICAN: >60 ML/MIN
GFR SERPL CREATININE-BSD FRML MDRD: ABNORMAL ML/MIN/{1.73_M2}
GLUCOSE BLD-MCNC: 139 MG/DL (ref 70–99)
HCT VFR BLD CALC: 45.6 % (ref 41–53)
HEMOGLOBIN: 15.5 G/DL (ref 13.5–17.5)
MCH RBC QN AUTO: 26.8 PG (ref 26–34)
MCHC RBC AUTO-ENTMCNC: 34.1 G/DL (ref 31–37)
MCV RBC AUTO: 78.7 FL (ref 80–100)
PDW BLD-RTO: 14.3 % (ref 12.5–15.4)
PLATELET # BLD: 351 K/UL (ref 140–450)
PMV BLD AUTO: 7.8 FL (ref 6–12)
POTASSIUM SERPL-SCNC: 4.5 MMOL/L (ref 3.7–5.3)
RBC # BLD: 5.8 M/UL (ref 4.5–5.9)
SODIUM BLD-SCNC: 141 MMOL/L (ref 135–144)
SURGICAL PATHOLOGY REPORT: NORMAL
WBC # BLD: 20.8 K/UL (ref 3.5–11)

## 2022-07-21 PROCEDURE — 96372 THER/PROPH/DIAG INJ SC/IM: CPT

## 2022-07-21 PROCEDURE — 6360000002 HC RX W HCPCS: Performed by: SURGERY

## 2022-07-21 PROCEDURE — 2580000003 HC RX 258: Performed by: INTERNAL MEDICINE

## 2022-07-21 PROCEDURE — 96376 TX/PRO/DX INJ SAME DRUG ADON: CPT

## 2022-07-21 PROCEDURE — 99232 SBSQ HOSP IP/OBS MODERATE 35: CPT | Performed by: INTERNAL MEDICINE

## 2022-07-21 PROCEDURE — 6370000000 HC RX 637 (ALT 250 FOR IP): Performed by: INTERNAL MEDICINE

## 2022-07-21 PROCEDURE — 99218 PR INITIAL OBSERVATION CARE/DAY 30 MINUTES: CPT | Performed by: HOSPITALIST

## 2022-07-21 PROCEDURE — 6360000002 HC RX W HCPCS: Performed by: INTERNAL MEDICINE

## 2022-07-21 PROCEDURE — 2580000003 HC RX 258: Performed by: HOSPITALIST

## 2022-07-21 PROCEDURE — 94760 N-INVAS EAR/PLS OXIMETRY 1: CPT

## 2022-07-21 PROCEDURE — 36415 COLL VENOUS BLD VENIPUNCTURE: CPT

## 2022-07-21 PROCEDURE — 6370000000 HC RX 637 (ALT 250 FOR IP): Performed by: SURGERY

## 2022-07-21 PROCEDURE — 96375 TX/PRO/DX INJ NEW DRUG ADDON: CPT

## 2022-07-21 PROCEDURE — 6360000002 HC RX W HCPCS: Performed by: HOSPITALIST

## 2022-07-21 PROCEDURE — 80048 BASIC METABOLIC PNL TOTAL CA: CPT

## 2022-07-21 PROCEDURE — G0378 HOSPITAL OBSERVATION PER HR: HCPCS

## 2022-07-21 PROCEDURE — 85027 COMPLETE CBC AUTOMATED: CPT

## 2022-07-21 RX ORDER — KETOROLAC TROMETHAMINE 15 MG/ML
15 INJECTION, SOLUTION INTRAMUSCULAR; INTRAVENOUS EVERY 6 HOURS
Status: DISCONTINUED | OUTPATIENT
Start: 2022-07-21 | End: 2022-07-22

## 2022-07-21 RX ADMIN — DEXTROSE AND SODIUM CHLORIDE: 5; 450 INJECTION, SOLUTION INTRAVENOUS at 21:48

## 2022-07-21 RX ADMIN — DEXTROSE AND SODIUM CHLORIDE: 5; 450 INJECTION, SOLUTION INTRAVENOUS at 13:37

## 2022-07-21 RX ADMIN — FENTANYL CITRATE 50 MCG: 50 INJECTION, SOLUTION INTRAMUSCULAR; INTRAVENOUS at 12:33

## 2022-07-21 RX ADMIN — OXYCODONE HYDROCHLORIDE 10 MG: 5 TABLET ORAL at 05:03

## 2022-07-21 RX ADMIN — ACETAMINOPHEN 1000 MG: 500 TABLET ORAL at 05:03

## 2022-07-21 RX ADMIN — ACETAMINOPHEN 1000 MG: 500 TABLET ORAL at 14:30

## 2022-07-21 RX ADMIN — CYCLOBENZAPRINE HYDROCHLORIDE 10 MG: 5 TABLET, FILM COATED ORAL at 21:42

## 2022-07-21 RX ADMIN — ONDANSETRON 4 MG: 2 INJECTION INTRAMUSCULAR; INTRAVENOUS at 07:08

## 2022-07-21 RX ADMIN — PIPERACILLIN AND TAZOBACTAM 4500 MG: 4; .5 INJECTION, POWDER, FOR SOLUTION INTRAVENOUS at 18:14

## 2022-07-21 RX ADMIN — CEFAZOLIN 2000 MG: 10 INJECTION, POWDER, FOR SOLUTION INTRAVENOUS at 05:06

## 2022-07-21 RX ADMIN — OXYCODONE HYDROCHLORIDE 10 MG: 5 TABLET ORAL at 00:30

## 2022-07-21 RX ADMIN — CYCLOBENZAPRINE HYDROCHLORIDE 10 MG: 5 TABLET, FILM COATED ORAL at 09:22

## 2022-07-21 RX ADMIN — OXYCODONE HYDROCHLORIDE 10 MG: 5 TABLET ORAL at 15:54

## 2022-07-21 RX ADMIN — KETOROLAC TROMETHAMINE 15 MG: 15 INJECTION, SOLUTION INTRAMUSCULAR; INTRAVENOUS at 23:05

## 2022-07-21 RX ADMIN — FENTANYL CITRATE 50 MCG: 50 INJECTION, SOLUTION INTRAMUSCULAR; INTRAVENOUS at 18:10

## 2022-07-21 RX ADMIN — ENOXAPARIN SODIUM 30 MG: 100 INJECTION SUBCUTANEOUS at 09:26

## 2022-07-21 RX ADMIN — FENTANYL CITRATE 50 MCG: 50 INJECTION, SOLUTION INTRAMUSCULAR; INTRAVENOUS at 08:00

## 2022-07-21 RX ADMIN — ONDANSETRON 4 MG: 2 INJECTION INTRAMUSCULAR; INTRAVENOUS at 14:34

## 2022-07-21 RX ADMIN — PIPERACILLIN AND TAZOBACTAM 4500 MG: 4; .5 INJECTION, POWDER, FOR SOLUTION INTRAVENOUS at 12:49

## 2022-07-21 RX ADMIN — ENOXAPARIN SODIUM 30 MG: 100 INJECTION SUBCUTANEOUS at 21:42

## 2022-07-21 RX ADMIN — GABAPENTIN 300 MG: 300 CAPSULE ORAL at 14:30

## 2022-07-21 RX ADMIN — POLYETHYLENE GLYCOL 3350 17 G: 17 POWDER, FOR SOLUTION ORAL at 09:26

## 2022-07-21 RX ADMIN — OXYCODONE HYDROCHLORIDE 10 MG: 5 TABLET ORAL at 09:22

## 2022-07-21 RX ADMIN — SODIUM CHLORIDE, PRESERVATIVE FREE 10 ML: 5 INJECTION INTRAVENOUS at 21:42

## 2022-07-21 RX ADMIN — GABAPENTIN 300 MG: 300 CAPSULE ORAL at 21:42

## 2022-07-21 RX ADMIN — GABAPENTIN 300 MG: 300 CAPSULE ORAL at 09:22

## 2022-07-21 RX ADMIN — KETOROLAC TROMETHAMINE 15 MG: 15 INJECTION, SOLUTION INTRAMUSCULAR; INTRAVENOUS at 12:33

## 2022-07-21 RX ADMIN — CYCLOBENZAPRINE HYDROCHLORIDE 10 MG: 5 TABLET, FILM COATED ORAL at 14:30

## 2022-07-21 RX ADMIN — KETOROLAC TROMETHAMINE 15 MG: 15 INJECTION, SOLUTION INTRAMUSCULAR; INTRAVENOUS at 17:57

## 2022-07-21 RX ADMIN — ACETAMINOPHEN 1000 MG: 500 TABLET ORAL at 21:41

## 2022-07-21 ASSESSMENT — PAIN DESCRIPTION - LOCATION
LOCATION: ABDOMEN

## 2022-07-21 ASSESSMENT — PAIN SCALES - GENERAL
PAINLEVEL_OUTOF10: 7
PAINLEVEL_OUTOF10: 10
PAINLEVEL_OUTOF10: 9
PAINLEVEL_OUTOF10: 4
PAINLEVEL_OUTOF10: 6
PAINLEVEL_OUTOF10: 7
PAINLEVEL_OUTOF10: 7
PAINLEVEL_OUTOF10: 10
PAINLEVEL_OUTOF10: 4

## 2022-07-21 ASSESSMENT — PAIN DESCRIPTION - ORIENTATION
ORIENTATION: RIGHT;UPPER;MID
ORIENTATION: MID;UPPER

## 2022-07-21 ASSESSMENT — PAIN - FUNCTIONAL ASSESSMENT
PAIN_FUNCTIONAL_ASSESSMENT: PREVENTS OR INTERFERES SOME ACTIVE ACTIVITIES AND ADLS
PAIN_FUNCTIONAL_ASSESSMENT: PREVENTS OR INTERFERES SOME ACTIVE ACTIVITIES AND ADLS

## 2022-07-21 ASSESSMENT — PAIN DESCRIPTION - DESCRIPTORS
DESCRIPTORS: SQUEEZING;ACHING
DESCRIPTORS: ACHING;SHARP
DESCRIPTORS: STABBING

## 2022-07-21 NOTE — PLAN OF CARE
Problem: Pain  Goal: Verbalizes/displays adequate comfort level or baseline comfort level  Outcome: Progressing     Problem: Discharge Planning  Goal: Discharge to home or other facility with appropriate resources  Outcome: Progressing     Problem: ABCDS Injury Assessment  Goal: Absence of physical injury  Recent Flowsheet Documentation  Taken 7/21/2022 0222 by Theodore Mcdowell RN  Absence of Physical Injury: Implement safety measures based on patient assessment  7/21/2022 0221 by Theodore Mcdowell RN  Outcome: Progressing

## 2022-07-21 NOTE — PROGRESS NOTES
PATIENT NAME: Ananya Lopez     TODAY'S DATE: 7/21/2022, 8:59 AM    SUBJECTIVE:    21 y/o male POD 2 laparoscopic robotic assisted fenestrated subtotal cholecystectomy and POD 1 ERCP with stent placement for bile leak from gallbladder remnant complains of nausea this am.  Post op pain controlled. Denies fevers, chills, or sweats. OBJECTIVE:   VITALS:  BP (!) 174/96   Pulse 87   Temp 98.4 °F (36.9 °C) (Oral)   Resp 18   Ht 6' 1\" (1.854 m)   Wt (!) 384 lb 7.7 oz (174.4 kg)   SpO2 96%   BMI 50.73 kg/m²      INTAKE/OUTPUT:      Intake/Output Summary (Last 24 hours) at 7/21/2022 0859  Last data filed at 7/21/2022 0711  Gross per 24 hour   Intake 1250 ml   Output 180 ml   Net 1070 ml                 CONSTITUTIONAL:  awake, Awake and alert. No acute distress  ABDOMEN:   Abdomen soft, non-tender, non-distended. Incisions are clean, dry, and intact with no drainage and no surrounding erythema. 10 cc of serous fluid with bile tinge. Color of fluid is significantly lighter since ERCP and stent placement. ASSESSMENT    POD 2 subtotal cholecystectomy   POD 1 ERCP with stent placement    Plan  Advance diet as tolerated. Will give zofran prn nausea.      Electronically signed by Sidra Rodriguez IV, DO  on 7/21/2022 at 8:59 AM

## 2022-07-21 NOTE — PROGRESS NOTES
GI ATTENDING  Gastroenterology  Progress Note     . SUBJECTIVE:  Patient seen at bedside in a.m. Status post ERCP with successful placement of biliary stent with biliary sphincterotomy  Bilious output from YOVANY drain has significantly reduced  Continues to have mild to moderate abdominal pain, persistent nausea, controlled with pain medication  Leukocytosis of 20  Afebrile, hemodynamically stable, tolerate some liquids      REASON FOR CONSULTATION: Evaluation for bile leak      HISTORY OF PRESENT ILLNESS:       Briefly, 41-year-old male with a past medical history of ADHD, depression, morbid obesity, PTSD, recent cholecystectomy, robotic assisted, fenestrated, performed on July 19, 2022, for symptomatic biliary colic, concern for postoperative bile leak. Evidence of bile output from YOVANY drain. Patient denies any significant abdominal pain or discomfort. No prior hepatobiliary history or significant GI history reported by patient.   GI was consulted to further evaluate for ERCP and biliary stent placement for suspected bile leak     Most recent inpatient labs:  BMP: Electrolytes within normal limits, creatinine normal  Mild elevation in ALT, total bilirubin appears to be within normal limits  White count of 17.3, hemoglobin 14.2, platelet count of 638        PAST MEDICAL HISTORY:  Past Medical History:   Diagnosis Date    ADHD (attention deficit hyperactivity disorder)     no meds since  age 11    Bile leak     Depressed     Morbid obesity ([facility] [address]     PTSD (post-traumatic stress disorder)      Past Surgical History:   Procedure Laterality Date    CHOLECYSTECTOMY, LAPAROSCOPIC      robotic    CHOLECYSTECTOMY, LAPAROSCOPIC N/A 07/19/2022    LAPAROSCOPIC ROBOTIC CHOLECYSTECTOMY WITH FIREFLY performed by Lula Rodriguez IV, DO at [address] [facility].    ERCP N/A 7/20/2022    ERCP SPHINCTER/PAPILLOTOMY performed by Monica Gotti MD at [place] [address]    ERCP  7/20/2022    ERCP STENT INSERTION performed by Caitlin Lee Ivana Chavez MD at [facility]  07/20/2022    ERCP SPHINCTER/PAPILLOTOMY    TONSILLECTOMY AND ADENOIDECTOMY         ALLERGIES:  No Known Allergies    HOME MEDICATIONS:  Prior to Admission medications    Medication Sig Start Date End Date Taking? Authorizing Provider   PERCOCET 5-325 MG per tablet Take 1 tablet by mouth every 8 hours as needed for Pain for up to 3 days. 7/19/22 7/22/22 Yes Jakob Tommie Canos IV, DO   ibuprofen (ADVIL;MOTRIN) 800 MG tablet Take 1 tablet by mouth in the morning and 1 tablet at noon and 1 tablet in the evening. 7/19/22  Yes Jakob Tommie Canos IV, DO   cyclobenzaprine (FLEXERIL) 10 MG tablet Take 1 tablet by mouth 3 times daily as needed for Muscle spasms 7/19/22 7/29/22 Yes Jakob Otmmie Canos IV, DO   docusate sodium (COLACE) 100 MG capsule Take 1 capsule by mouth 2 times daily as needed for Constipation (take as needed for constipation) 7/19/22  Yes Jakob Tommie Canos IV, DO     .  CURRENT MEDICATIONS:  Scheduled Meds:   ketorolac  15 mg IntraVENous Q6H    piperacillin-tazobactam  4,500 mg IntraVENous Q8H    sodium chloride flush  10 mL IntraVENous 2 times per day    polyethylene glycol  17 g Oral Daily    acetaminophen  1,000 mg Oral 3 times per day    gabapentin  300 mg Oral TID    enoxaparin  30 mg SubCUTAneous BID    cyclobenzaprine  10 mg Oral TID     . Continuous Infusions:   dextrose 5 % and 0.45 % NaCl 125 mL/hr at 07/21/22 1337    sodium chloride       .   PRN Meds:fentanNYL, oxyCODONE, sodium chloride flush, sodium chloride, ondansetron **OR** ondansetron, senna, oxyCODONE  .  SOCIAL HISTORY:     Social History     Socioeconomic History    Marital status:      Spouse name: Not on file    Number of children: Not on file    Years of education: Not on file    Highest education level: Not on file   Occupational History    Not on file   Tobacco Use    Smoking status: Light Smoker     Packs/day: 0.25     Types: Cigarettes, E-Cigarettes     Start date: 2013 Smokeless tobacco: Former     Types: Chew   Vaping Use    Vaping Use: Every day    Substances: Nicotine   Substance and Sexual Activity    Alcohol use: Not Currently     Comment: social    Drug use: Not Currently     Types: Marijuana Celia Postin)     Comment: no use since MArch 2022    Sexual activity: Not on file   Other Topics Concern    Not on file   Social History Narrative    Not on file     Social Determinants of Health     Financial Resource Strain: Low Risk     Difficulty of Paying Living Expenses: Not hard at all   Food Insecurity: No Food Insecurity    Worried About Running Out of Food in the Last Year: Never true    Ran Out of Food in the Last Year: Never true   Transportation Needs: Not on file   Physical Activity: Not on file   Stress: Not on file   Social Connections: Not on file   Intimate Partner Violence: Not on file   Housing Stability: Not on file       FAMILY HISTORY:   History reviewed. No pertinent family history. REVIEW OF SYSTEMS:     Constitutional: No fever, no chills, no lethargy, no weakness. HEENT:  No headache, otalgia, itchy eyes, nasal discharge or sore throat. Cardiac:  No chest pain, dyspnea, orthopnea or PND. Chest:   No cough, phlegm or wheezing. Abdomen:  No abdominal pain, nausea or vomiting. Neuro:  No focal weakness, abnormal movements or seizure like activity. Skin:   No rashes, no itching. :   No hematuria, no pyuria, no dysuria, no flank pain. Extremities:  No swelling or joint pains. ROS was otherwise negative except as mentioned in the [address] PHYSICAL EXAM:    BP (!) 179/104   Pulse 82   Temp 97.5 °F (36.4 °C) (Oral)   Resp 18   Ht 6' 1\" (1.854 m)   Wt (!) 384 lb 7.7 oz (174.4 kg)   SpO2 94%   BMI 50.73 kg/m²   . TMAX[24]    General: Well developed, Well nourished, No apparent distress  Head:  Normocephalic, Atraumatic  EENT: EOMI, Sclera not icteric, Oropharynx moist  Neck:  Supple, Trachea midline  Lungs:CTA Bilaterally  Heart: RRR, No murmur, No rub, No gallop, PMI nondisplaced. Abdomen:Soft, Non tender, Not distended, BS WNL,  No masses. Ext:No clubbing. No cyanosis. No edema. Skin: No rashes. No jaundice. No stigmata of liver disease. Neuro:  A&O x Three, No focal neurological deficits    LABS and IMAGING:     Hemotological labs:   ANEMIA STUDIES  No results for input(s): LABIRON, TIBC, FERRITIN, [id number], FOLATE, OCCULTBLD in the last 72 hours. CBC  Recent Labs     07/20/22  1134 07/21/22  1044   WBC 17.3* 20.8*   HGB 14.2 15.5   MCV 77.7* 78.7*   RDW 14.1 14.3    351       PT/INR  No results for input(s): PROTIME, INR in the last 72 hours. BMP  Recent Labs     07/20/22  1134 07/21/22  1044    141   K 4.2 4.5    103   CO2 26 28   BUN 9 13   CREATININE 0.61* 0.86   GLUCOSE 138* 139*   CALCIUM 9.4 9.0       LIVER WORK UP  Hepatitis Functional Panel:  Recent Labs     07/20/22  1134   ALKPHOS 85   ALT 79*   AST 27   PROT 7.2   BILITOT 0.94   BILIDIR 0.21   LABALBU 4.5       AMYLASE/LIPASE/AMMONIA  No results for input(s): AMYLASE, LIPASE, AMMONIA in the last 72 hours. Acute Hepatitis Panel   No results found for: HEPBSAG, HEPCAB, HEPBIGM, HEPAIGM    HCV Genotype   No results found for: HEPATITISCGENOTYPE    HCV Quantitative   No results found for: HCVQNT    Metabolic work up:  Ceruloplasmin  AA work up:  Alpha 1 antitrypsin   No results found for: A1A  AMA:  No results found for: MITOAB    ASMA:  No results found for: SMOOTHMUSCAB    ANCA:    LASHAE:  No results found for: LASHAE    Anti - Liver/Kidney Ab:  No results found for: LIVER-KIDNEYMICROSOMALAB    Ceruloplasmin:  No results found for: CERULOPLSM    Celiac panel:  No results found for: TISSTRNTIIGG, TTGIGA, IGA    Cancer Markers:  CEA:    No results for input(s): CEA in the last 72 hours. Ca 125:   No results for input(s):  in the last 72 hours. Ca 19-9:     Invalid input(s):   AFP: No results for input(s): AFP in the last 72 hours.      ASSESSMENT and PLAN:     25-year-old male with a past medical history of ADHD, depression, morbid obesity, PTSD, recent cholecystectomy, robotic assisted, fenestrated, performed on July 19, 2022, for symptomatic biliary colic, concern for postoperative bile leak. Evidence of bile output from YOVANY drain. Patient denies any significant abdominal pain or discomfort. No prior hepatobiliary history or significant GI history reported by patient. GI was consulted to further evaluate for ERCP and biliary stent placement for suspected bile leak    IMPRESSION: Bile leak status post successful biliary stent placement via ERCP      Recommendation:  -Continue monitor CBC, continue Zosyn IV, advance diet as tolerated   - Reduced bile output from YOVANY drain noted  --Will continue to monitor clinically during in-patient course. Thank you for allowing us to take part in the patients care  Contact GI for any remaining questions, we are available. Electronically signed by:  Lazaro Scherer MD     Gastroenterology  7/21/2022    4:11 PM     his note is created with the assistance of a speech recognition program.  While intending to generate a document that actually reflects the content of the visit, the document can still have some errors including those of syntax and sound a like substitutions which may escape proof reading. It such instances, actual meaning can be extrapolated by contextual diversion.

## 2022-07-21 NOTE — ANESTHESIA POSTPROCEDURE EVALUATION
Department of Anesthesiology  Postprocedure Note    Patient: Faye Camara  MRN: [id number]  YOB: 2000  Date of evaluation: 7/21/2022      Procedure Summary     Date: 07/20/22 Room / Location: [address]    Anesthesia Start: 5367 Anesthesia Stop: 0319    Procedures:       ERCP SPHINCTER/PAPILLOTOMY      ERCP STENT INSERTION Diagnosis:       Bile leak      (BILE LEAK)    Surgeons: Jolanta Thakkar MD Responsible Provider: Mary Gustafson MD    Anesthesia Type: general ASA Status: 3          Anesthesia Type: No value filed.     Vanessa Phase I: Vanessa Score: 8    Vanessa Phase II:      POST-OP ANESTHESIA NOTE       BP (!) 174/96   Pulse 87   Temp 98.4 °F (36.9 °C) (Oral)   Resp 17   Ht 6' 1\" (1.854 m)   Wt (!) 384 lb 7.7 oz (174.4 kg)   SpO2 97%   BMI 50.73 kg/m²    Pain Assessment: Face, Legs, Activity, Cry, and Consolability (FLACC)  Pain Level: 10           Anesthesia Post Evaluation    Patient location during evaluation: PACU  Patient participation: complete - patient participated  Level of consciousness: awake  Pain score: 10  Airway patency: patent  Nausea & Vomiting: no vomiting and no nausea  Complications: no  Cardiovascular status: hemodynamically stable  Respiratory status: acceptable  Hydration status: stable

## 2022-07-21 NOTE — CONSULTS
[facility]  Office: [address], DO, Mariaelena Cummins, DO, Aviva Pack, DO, Jared Mosley, DO, Kathy Chaidez MD, Jessica Olivera MD, Kennis Osler, MD, Mily Gonzalez MD,  Yamilet Contreras MD, Bridget Low MD, Sam Parks, DO, Alivia Britton MD,  Melquiades Pruitt MD, Shweta Richardson MD, Trisha Kathleen DO, Segundo Barry MD, Harish Nichols MD, Lito Peralta MD, Gonzalo Don, DO, Goldie Gerber MD, Gilbert Dutta MD, Montserrat Arreaga CNP,  Anabella Bowman CNP, Yuriy De Dios CNP, Mariano Mtz CNP, Nancy Dickens PA-C, [facility], Matty Mariscal, CNP, Tami Torres, CNP, [place], [facility], [facility], CNP, Gil Minor, CNS, Blanca [facility], Azam Keen, CNP, Rosaline Ochoa, CNP, Radha Berg, CNP, Cassia Peña, CNP           [address] / HISTORY AND PHYSICAL EXAMINATION            Date:   7/21/2022  Patient name:  Beverley Delgado  Date of admission:  7/19/2022 12:10 PM  MRN:   [id number]  Account:  [de-identified]  YOB: 2000  PCP:    DIGNA Perry  Room:   323/[facility]-  Code Status:    Full Code    Physician Requesting Consult: Phillip Rodriguez IV, *    Reason for Consult: Postoperative medical management/recommendations    Chief Complaint:     \" My belly hurts\"    History Obtained From:     patient, spouse, family member -mother, electronic medical record    History of Present Illness: This very pleasant 19-year-old male presented to hospital abdominal pain. He was found to have biliary colic and ultimately underwent robotic assisted laparoscopic subtotal cholecystectomy. Postoperatively he did have evidence of a bile leak and subsequently underwent ERCP and stenting. He has had issues with pain control and has a significant leukocytosis. He has been on Ancef which I am going to transition over to Zosyn for better intra-abdominal coverage. Marjorie Stokes Canos IV, DO   cyclobenzaprine (FLEXERIL) 10 MG tablet Take 1 tablet by mouth 3 times daily as needed for Muscle spasms 7/19/22 7/29/22 Yes Jakob Reilly Canos IV, DO   docusate sodium (COLACE) 100 MG capsule Take 1 capsule by mouth 2 times daily as needed for Constipation (take as needed for constipation) 7/19/22  Yes Marjorie Kike Canos IV, DO        Allergies:     Patient has no known allergies. Social History:     Tobacco:    reports that he has been smoking cigarettes and e-cigarettes. He started smoking about 9 years ago. He has been smoking an average of .25 packs per day. He has quit using smokeless tobacco.  His smokeless tobacco use included chew. Alcohol:      reports that he does not currently use alcohol. Drug Use:  reports that he does not currently use drugs after having used the following drugs: Marijuana Recinos Ronnie). Family History:     History reviewed. No pertinent family history. Review of Systems:     Positive and Negative as described in HPI. CONSTITUTIONAL:  negative for fevers, chills, sweats, fatigue, weight loss  HEENT:  negative for vision, hearing changes, runny nose, throat pain  RESPIRATORY:  negative for shortness of breath, cough, congestion, wheezing. CARDIOVASCULAR:  negative for chest pain, palpitations.   GASTROINTESTINAL: Positive for nausea, vomiting, abdominal pain, bloating  GENITOURINARY:  negative for difficulty of urination, burning with urination, frequency   INTEGUMENT:  negative for rash, skin lesions, easy bruising   HEMATOLOGIC/LYMPHATIC:  negative for swelling/edema   ALLERGIC/IMMUNOLOGIC:  negative for urticaria , itching  ENDOCRINE:  negative increase in drinking, increase in urination, hot or cold intolerance  MUSCULOSKELETAL:  negative joint pains, muscle aches, swelling of joints  NEUROLOGICAL:  negative for headaches, dizziness, lightheadedness, numbness, pain, tingling extremities  BEHAVIOR/PSYCH:  negative for depression, anxiety    Physical Exam:     BP (!) 174/96   Pulse 87   Temp 98.4 °F (36.9 °C) (Oral)   Resp 17   Ht 6' 1\" (1.854 m)   Wt (!) 384 lb 7.7 oz (174.4 kg)   SpO2 97%   BMI 50.73 kg/m²   Temp (24hrs), Av.1 °F (36.7 °C), Min:97 °F (36.1 °C), Max:98.8 °F (37.1 °C)    Recent Labs     22  0930 22  1213   POCGLU 132* 114*       Intake/Output Summary (Last 24 hours) at 2022 1331  Last data filed at 2022 4865  Gross per 24 hour   Intake 1250 ml   Output 180 ml   Net 1070 ml       General Appearance:  alert, well appearing, and in no acute distress  Mental status: oriented to person, place, and time with normal affect  Head:  normocephalic, atraumatic. Eye: no icterus, redness, pupils equal and reactive, extraocular eye movements intact, conjunctiva clear  Ear: normal external ear, no discharge, hearing intact  Nose:  no drainage noted  Mouth: mucous membranes moist  Neck: supple, no carotid bruits, thyroid not palpable  Lungs: Bilateral equal air entry, clear to ausculation, no wheezing, rales or rhonchi, normal effort  Cardiovascular: normal rate, regular rhythm, no murmur, gallop, rub. Abdomen: Soft, generalized tenderness with most tenderness in the right upper quadrant, slightly distended, normal bowel sounds, no hepatomegaly or splenomegaly.   Drain in place with serosanguineous drainage  Neurologic: There are no new focal motor or sensory deficits, normal muscle tone and bulk, no abnormal sensation, normal speech, cranial nerves II through XII grossly intact  Skin: No gross lesions, rashes, bruising or bleeding on exposed skin area  Extremities:  peripheral pulses palpable, no pedal edema or calf pain with palpation  Psych: normal affect     Investigations:      Laboratory Testing:  Recent Results (from the past 24 hour(s))   Basic Metabolic Panel    Collection Time: 22 10:44 AM   Result Value Ref Range    Glucose 139 (H) 70 - 99 mg/dL    BUN 13 6 - 20 mg/dL    Creatinine 0.86 0.70 - 1.20 mg/dL    Calcium 9.0 8.6 - 10.4 mg/dL    Sodium 141 135 - 144 mmol/L    Potassium 4.5 3.7 - 5.3 mmol/L    Chloride 103 98 - 107 mmol/L    CO2 28 20 - 31 mmol/L    Anion Gap 10 9 - 17 mmol/L    GFR Non-African American >60 >60 mL/min    GFR African American >60 >60 mL/min    GFR Comment         CBC    Collection Time: 07/21/22 10:44 AM   Result Value Ref Range    WBC 20.8 (H) 3.5 - 11.0 k/uL    RBC 5.80 4.5 - 5.9 m/uL    Hemoglobin 15.5 13.5 - 17.5 g/dL    Hematocrit 45.6 41 - 53 %    MCV 78.7 (L) 80 - 100 fL    MCH 26.8 26 - 34 pg    MCHC 34.1 31 - 37 g/dL    RDW 14.3 12.5 - 15.4 %    Platelets 138 927 - 560 k/uL    MPV 7.8 6.0 - 12.0 fL       Imaging/Diagonstics:  No results found.     Assessment :      Hospital Problems             Last Modified POA    * (Principal) Status post cholecystectomy 7/19/2022 Yes    Post-op pain 7/20/2022 Yes    Bile leak 7/20/2022 Yes       Plan:     Biliary colic  Status post subtotal colectomy  Bile leak  Status post ERCP and stenting  Postoperative pain  Continue present narcotic regimen  Add Toradol IV  Monitor renal function  Eventual transition to oral nonsteroidal  Leukocytosis  Transition to Zosyn    Consultations:   [address] TO HOSPITALIST      Shaun Han DO  7/21/2022  1:31 PM    Copy sent to Dr. Justo Haile, PA no

## 2022-07-22 VITALS
HEIGHT: 73 IN | BODY MASS INDEX: 41.75 KG/M2 | HEART RATE: 92 BPM | WEIGHT: 315 LBS | SYSTOLIC BLOOD PRESSURE: 152 MMHG | TEMPERATURE: 98.6 F | RESPIRATION RATE: 16 BRPM | DIASTOLIC BLOOD PRESSURE: 87 MMHG | OXYGEN SATURATION: 94 %

## 2022-07-22 PROBLEM — D72.829 LEUKOCYTOSIS: Status: ACTIVE | Noted: 2022-07-22

## 2022-07-22 LAB
ANION GAP SERPL CALCULATED.3IONS-SCNC: 9 MMOL/L (ref 9–17)
BUN BLDV-MCNC: 12 MG/DL (ref 6–20)
CALCIUM SERPL-MCNC: 8.9 MG/DL (ref 8.6–10.4)
CHLORIDE BLD-SCNC: 101 MMOL/L (ref 98–107)
CO2: 29 MMOL/L (ref 20–31)
CREAT SERPL-MCNC: 0.93 MG/DL (ref 0.7–1.2)
GFR AFRICAN AMERICAN: >60 ML/MIN
GFR NON-AFRICAN AMERICAN: >60 ML/MIN
GFR SERPL CREATININE-BSD FRML MDRD: ABNORMAL ML/MIN/{1.73_M2}
GLUCOSE BLD-MCNC: 136 MG/DL (ref 70–99)
HCT VFR BLD CALC: 43.4 % (ref 41–53)
HEMOGLOBIN: 14.5 G/DL (ref 13.5–17.5)
MCH RBC QN AUTO: 26.4 PG (ref 26–34)
MCHC RBC AUTO-ENTMCNC: 33.4 G/DL (ref 31–37)
MCV RBC AUTO: 79.1 FL (ref 80–100)
PDW BLD-RTO: 14.5 % (ref 12.5–15.4)
PLATELET # BLD: 270 K/UL (ref 140–450)
PMV BLD AUTO: 8 FL (ref 6–12)
POTASSIUM SERPL-SCNC: 4.4 MMOL/L (ref 3.7–5.3)
RBC # BLD: 5.48 M/UL (ref 4.5–5.9)
SODIUM BLD-SCNC: 139 MMOL/L (ref 135–144)
WBC # BLD: 16.8 K/UL (ref 3.5–11)

## 2022-07-22 PROCEDURE — 6360000002 HC RX W HCPCS: Performed by: HOSPITALIST

## 2022-07-22 PROCEDURE — 6370000000 HC RX 637 (ALT 250 FOR IP): Performed by: INTERNAL MEDICINE

## 2022-07-22 PROCEDURE — 96372 THER/PROPH/DIAG INJ SC/IM: CPT

## 2022-07-22 PROCEDURE — 36415 COLL VENOUS BLD VENIPUNCTURE: CPT

## 2022-07-22 PROCEDURE — 99232 SBSQ HOSP IP/OBS MODERATE 35: CPT | Performed by: INTERNAL MEDICINE

## 2022-07-22 PROCEDURE — G0378 HOSPITAL OBSERVATION PER HR: HCPCS

## 2022-07-22 PROCEDURE — 96366 THER/PROPH/DIAG IV INF ADDON: CPT

## 2022-07-22 PROCEDURE — 96365 THER/PROPH/DIAG IV INF INIT: CPT

## 2022-07-22 PROCEDURE — 6360000002 HC RX W HCPCS: Performed by: INTERNAL MEDICINE

## 2022-07-22 PROCEDURE — 2580000003 HC RX 258: Performed by: HOSPITALIST

## 2022-07-22 PROCEDURE — 96376 TX/PRO/DX INJ SAME DRUG ADON: CPT

## 2022-07-22 PROCEDURE — 85027 COMPLETE CBC AUTOMATED: CPT

## 2022-07-22 PROCEDURE — 99225 PR SBSQ OBSERVATION CARE/DAY 25 MINUTES: CPT | Performed by: HOSPITALIST

## 2022-07-22 PROCEDURE — 6370000000 HC RX 637 (ALT 250 FOR IP): Performed by: SURGERY

## 2022-07-22 PROCEDURE — 6370000000 HC RX 637 (ALT 250 FOR IP): Performed by: HOSPITALIST

## 2022-07-22 PROCEDURE — 2580000003 HC RX 258: Performed by: INTERNAL MEDICINE

## 2022-07-22 PROCEDURE — 80048 BASIC METABOLIC PNL TOTAL CA: CPT

## 2022-07-22 RX ORDER — IBUPROFEN 600 MG/1
600 TABLET ORAL EVERY 6 HOURS
Status: DISCONTINUED | OUTPATIENT
Start: 2022-07-22 | End: 2022-07-22 | Stop reason: HOSPADM

## 2022-07-22 RX ORDER — OXYCODONE HYDROCHLORIDE 5 MG/1
5 TABLET ORAL EVERY 4 HOURS PRN
Status: DISCONTINUED | OUTPATIENT
Start: 2022-07-22 | End: 2022-07-22 | Stop reason: HOSPADM

## 2022-07-22 RX ORDER — AMOXICILLIN AND CLAVULANATE POTASSIUM 875; 125 MG/1; MG/1
1 TABLET, FILM COATED ORAL 2 TIMES DAILY
Qty: 14 TABLET | Refills: 0 | Status: SHIPPED | OUTPATIENT
Start: 2022-07-22 | End: 2022-07-29

## 2022-07-22 RX ORDER — MAGNESIUM HYDROXIDE/ALUMINUM HYDROXICE/SIMETHICONE 120; 1200; 1200 MG/30ML; MG/30ML; MG/30ML
30 SUSPENSION ORAL EVERY 6 HOURS PRN
Status: DISCONTINUED | OUTPATIENT
Start: 2022-07-22 | End: 2022-07-22 | Stop reason: HOSPADM

## 2022-07-22 RX ADMIN — GABAPENTIN 300 MG: 300 CAPSULE ORAL at 13:53

## 2022-07-22 RX ADMIN — KETOROLAC TROMETHAMINE 15 MG: 15 INJECTION, SOLUTION INTRAMUSCULAR; INTRAVENOUS at 05:54

## 2022-07-22 RX ADMIN — POLYETHYLENE GLYCOL 3350 17 G: 17 POWDER, FOR SOLUTION ORAL at 09:02

## 2022-07-22 RX ADMIN — ACETAMINOPHEN 1000 MG: 500 TABLET ORAL at 13:54

## 2022-07-22 RX ADMIN — ALUMINUM HYDROXIDE, MAGNESIUM HYDROXIDE, AND SIMETHICONE 30 ML: 200; 200; 20 SUSPENSION ORAL at 11:50

## 2022-07-22 RX ADMIN — IBUPROFEN 600 MG: 600 TABLET, FILM COATED ORAL at 13:53

## 2022-07-22 RX ADMIN — ACETAMINOPHEN 1000 MG: 500 TABLET ORAL at 05:54

## 2022-07-22 RX ADMIN — ENOXAPARIN SODIUM 30 MG: 100 INJECTION SUBCUTANEOUS at 08:53

## 2022-07-22 RX ADMIN — OXYCODONE HYDROCHLORIDE 10 MG: 5 TABLET ORAL at 10:09

## 2022-07-22 RX ADMIN — PIPERACILLIN AND TAZOBACTAM 4500 MG: 4; .5 INJECTION, POWDER, FOR SOLUTION INTRAVENOUS at 09:10

## 2022-07-22 RX ADMIN — CYCLOBENZAPRINE HYDROCHLORIDE 10 MG: 5 TABLET, FILM COATED ORAL at 08:53

## 2022-07-22 RX ADMIN — SODIUM CHLORIDE, PRESERVATIVE FREE 10 ML: 5 INJECTION INTRAVENOUS at 08:54

## 2022-07-22 RX ADMIN — GABAPENTIN 300 MG: 300 CAPSULE ORAL at 08:53

## 2022-07-22 RX ADMIN — IBUPROFEN 600 MG: 600 TABLET, FILM COATED ORAL at 08:53

## 2022-07-22 RX ADMIN — PIPERACILLIN AND TAZOBACTAM 4500 MG: 4; .5 INJECTION, POWDER, FOR SOLUTION INTRAVENOUS at 02:15

## 2022-07-22 RX ADMIN — CYCLOBENZAPRINE HYDROCHLORIDE 10 MG: 5 TABLET, FILM COATED ORAL at 13:53

## 2022-07-22 ASSESSMENT — PAIN SCALES - GENERAL
PAINLEVEL_OUTOF10: 7
PAINLEVEL_OUTOF10: 6
PAINLEVEL_OUTOF10: 2
PAINLEVEL_OUTOF10: 4
PAINLEVEL_OUTOF10: 9

## 2022-07-22 ASSESSMENT — PAIN DESCRIPTION - PAIN TYPE: TYPE: SURGICAL PAIN

## 2022-07-22 ASSESSMENT — PAIN DESCRIPTION - LOCATION
LOCATION: ABDOMEN

## 2022-07-22 ASSESSMENT — PAIN DESCRIPTION - DESCRIPTORS
DESCRIPTORS: PRESSURE
DESCRIPTORS: ACHING

## 2022-07-22 ASSESSMENT — PAIN DESCRIPTION - ORIENTATION
ORIENTATION: RIGHT

## 2022-07-22 NOTE — DISCHARGE SUMMARY
[facility]  Office: [address], DO, Alan Rees, DO, Jimbo Mauricionunu, DO, Ronniekian Rojas Blood, DO, Sandra Li MD, [place] MD, Roxanne Jacobsen MD, Arturo Brasher MD,  Vlad Knapp MD, Gabi Alexandre MD, Michelle Martin, DO, Harpal Jeffrey MD,  Ruy Campos MD, Venita Vanegas MD, Bob Richardson DO, Fransisca Zacarias MD, Miguel Thorpe MD, Josefina Lynch MD, Maggy Sams DO, Nigel Oliveira MD, Johanna Delacruz MD, Alan Venegas, CNP,  Dell Conti, CNP, Nestor Negrete, CNP, Gigi Luis, CNP, Omar Gonzalez PA-C, Latisha Cisse, DNP, Asad Salinas, CNP, Karthik Pat, CNP, Alanis Vivar, CNP, Ruthann Ragsdale, CNP, [address] Lane, CNS, Asia Crouch, [facility], Rodolfo Nathan, CNP, Terrance Eldridge, CNP, Sharmin Perez, CNP, Adriana Sauer, CNP           [address] [facility]    Discharge Summary     Patient ID: Rosi Muñoz  :  2000   MRN: [id number]     ACCOUNT:  [de-identified]   Patient's PCP: DIGNA Ambrosio  Admit Date: 2022   Discharge Date: 2022    Length of Stay: 0  Code Status:  Full Code  Admitting Physician: Claritza Valadez DO  Discharge Physician: Lise Aquino DO     Active Discharge Diagnoses:     Hospital Problem Lists:  Principal Problem:    Status post cholecystectomy  Active Problems:    Postoperative pain    Bile leak    Leukocytosis  Resolved Problems:    * No resolved hospital problems. *      Admission Condition:  fair     Discharged Condition: good    Hospital Stay:     Hospital Course: Rosi Muñoz is a 20 y.o. male who was admitted for the management of  Status post cholecystectomy , presented to ER with abdominal pain. This very pleasant 28-year-old male presented to hospital with abdominal pain. He was found to have biliary colic and underwent a laparoscopic cholecystectomy. Due to his body habitus it was a subtotal cholecystectomy.   Postoperatively he did have evidence of a bile leak and underwent ERCP and stenting. He had a leukocytosis on admission and during the course of the admission was transitioned to Zosyn. Regarding pain control he was treated with combination of anti-inflammatories in addition to narcotics. The patient's diet was advanced and ultimately he is discharged home. He is to take Motrin for anti-inflammatory purposes. He is to take Percocet for pain control. He has been provided scripts for Flexeril as well as Colace. He is to continue Augmentin for 7 days postdischarge. Patient is discharged in stable condition. Significant therapeutic interventions: As above    Significant Diagnostic Studies:   Labs / Micro:  CBC:   Lab Results   Component Value Date/Time    WBC 16.8 07/22/2022 05:20 AM    RBC 5.48 07/22/2022 05:20 AM    HGB 14.5 07/22/2022 05:20 AM    HCT 43.4 07/22/2022 05:20 AM    MCV 79.1 07/22/2022 05:20 AM    MCH 26.4 07/22/2022 05:20 AM    MCHC 33.4 07/22/2022 05:20 AM    RDW 14.5 07/22/2022 05:20 AM     07/22/2022 05:20 AM     BMP:    Lab Results   Component Value Date/Time    GLUCOSE 136 07/22/2022 05:20 AM     07/22/2022 05:20 AM    K 4.4 07/22/2022 05:20 AM     07/22/2022 05:20 AM    CO2 29 07/22/2022 05:20 AM    ANIONGAP 9 07/22/2022 05:20 AM    BUN 12 07/22/2022 05:20 AM    CREATININE 0.93 07/22/2022 05:20 AM    CALCIUM 8.9 07/22/2022 05:20 AM    LABGLOM >60 07/22/2022 05:20 AM    GFRAA >60 07/22/2022 05:20 AM    GFR      07/22/2022 05:20 AM        Radiology:  No results found. Consultations:    Consults:     Final Specialist Recommendations/Findings:   IP CONSULT TO GI  IP CONSULT TO HOSPITALIST      The patient was seen and examined on day of discharge and this discharge summary is in conjunction with any daily progress note from day of discharge.     Discharge plan:     Disposition: Home    Physician Follow [place]  [address]  [place] [phone]  [phone]    Follow up in 2 week(s)  Post Op Visit    Diana Ramos, [address] [facility] [address]  Merly Bowensser  [facility] [address] [facility], [address] [place] [address] 36. [phone]    Schedule an appointment as soon as possible for a visit in 2 week(s)  For follow up and discuss stent removal       Requiring Further Evaluation/Follow Up POST HOSPITALIZATION/Incidental Findings: Follow-up with general surgery for drain removal, follow-up with GI for stent removal    Diet: regular diet    Activity: As tolerated    Instructions to Patient: None    Discharge Medications:      Medication List        START taking these medications      amoxicillin-clavulanate 875-125 MG per tablet  Commonly known as: AUGMENTIN  Take 1 tablet by mouth in the morning and 1 tablet before bedtime. Do all this for 7 days. cyclobenzaprine 10 MG tablet  Commonly known as: FLEXERIL  Take 1 tablet by mouth 3 times daily as needed for Muscle spasms     docusate sodium 100 MG capsule  Commonly known as: Colace  Take 1 capsule by mouth 2 times daily as needed for Constipation (take as needed for constipation)     Percocet 5-325 MG per tablet  Generic drug: oxyCODONE-acetaminophen  Take 1 tablet by mouth every 8 hours as needed for Pain for up to 3 days. CHANGE how you take these medications      ibuprofen 800 MG tablet  Commonly known as: ADVIL;MOTRIN  Take 1 tablet by mouth in the morning and 1 tablet at noon and 1 tablet in the evening.   What changed:   medication strength  how much to take  when to take this  reasons to take this            STOP taking these medications      esomeprazole 40 MG delayed release capsule  Commonly known as: NexIUM               Where to Get Your Medications        These medications were sent to [facility] [address], [place] [phone] - F [phone]  [address] [place] [address] Phone: [phone]   amoxicillin-clavulanate 875-125 MG per tablet       You can get these medications from any pharmacy    Bring a paper prescription for each of these medications  cyclobenzaprine 10 MG tablet  docusate sodium 100 MG capsule  ibuprofen 800 MG tablet  Percocet 5-325 MG per tablet         No discharge procedures on file. Time Spent on discharge is  20 mins in patient examination, evaluation, counseling as well as medication reconciliation, prescriptions for required medications, discharge plan and follow up. Electronically signed by   Margarito Lacey DO  7/22/2022  2:23 PM      Thank you DIGNA Keita for the opportunity to be involved in this patient's care.

## 2022-07-22 NOTE — PROGRESS NOTES
GI ATTENDING  Gastroenterology  Progress Note     . SUBJECTIVE:  Clinically improved  Leukocytosis improving  Abdominal pain appears to be improving  Minimal output from YOVANY drain with regards to bowel  Overall, improved      REASON FOR CONSULTATION: Bile leak status post ERCP with stent placement      HISTORY OF PRESENT ILLNESS:          Briefly, 23-year-old male with a past medical history of ADHD, depression, morbid obesity, PTSD, recent cholecystectomy, robotic assisted, fenestrated, performed on July 19, 2022, for symptomatic biliary colic, concern for postoperative bile leak. Evidence of bile output from YOVANY drain. Patient denies any significant abdominal pain or discomfort. No prior hepatobiliary history or significant GI history reported by patient.   GI was consulted to further evaluate for ERCP and biliary stent placement for suspected bile leak     Most recent inpatient labs:  BMP: Electrolytes within normal limits, creatinine normal  Mild elevation in ALT, total bilirubin appears to be within normal limits  White count of 17.3, hemoglobin 14.2, platelet count of 136            PAST MEDICAL HISTORY:  Past Medical History:   Diagnosis Date    ADHD (attention deficit hyperactivity disorder)     no meds since  age 11    Bile leak     Depressed     Morbid obesity ([address]     PTSD (post-traumatic stress disorder)      Past Surgical History:   Procedure Laterality Date    CHOLECYSTECTOMY, LAPAROSCOPIC      robotic    CHOLECYSTECTOMY, LAPAROSCOPIC N/A 07/19/2022    LAPAROSCOPIC ROBOTIC CHOLECYSTECTOMY WITH FIREFLY performed by Tabitha Rodriguez IV, DO at [address] [facility].    ERCP N/A 7/20/2022    ERCP SPHINCTER/PAPILLOTOMY performed by Roxann Askew MD at [facility]    ERCP  7/20/2022    ERCP STENT INSERTION performed by Roxann Askew MD at [facility]  07/20/2022    ERCP SPHINCTER/PAPILLOTOMY    TONSILLECTOMY AND ADENOIDECTOMY         ALLERGIES:  No Known Allergies    HOME MEDICATIONS:  Prior to Admission medications    Medication Sig Start Date End Date Taking? Authorizing Provider   PERCOCET 5-325 MG per tablet Take 1 tablet by mouth every 8 hours as needed for Pain for up to 3 days. 7/19/22 7/22/22 Yes Jakob Tommie Canos IV, DO   ibuprofen (ADVIL;MOTRIN) 800 MG tablet Take 1 tablet by mouth in the morning and 1 tablet at noon and 1 tablet in the evening. 7/19/22  Yes Jakob Tommie Canos IV, DO   cyclobenzaprine (FLEXERIL) 10 MG tablet Take 1 tablet by mouth 3 times daily as needed for Muscle spasms 7/19/22 7/29/22 Yes Jakob Tommie Canos IV, DO   docusate sodium (COLACE) 100 MG capsule Take 1 capsule by mouth 2 times daily as needed for Constipation (take as needed for constipation) 7/19/22  Yes Jakob Tommie Canos IV, DO     .  CURRENT MEDICATIONS:  Scheduled Meds:   ibuprofen  600 mg Oral Q6H    piperacillin-tazobactam  4,500 mg IntraVENous Q8H    sodium chloride flush  10 mL IntraVENous 2 times per day    polyethylene glycol  17 g Oral Daily    acetaminophen  1,000 mg Oral 3 times per day    gabapentin  300 mg Oral TID    enoxaparin  30 mg SubCUTAneous BID    cyclobenzaprine  10 mg Oral TID     . Continuous Infusions:   sodium chloride       . PRN Meds:oxyCODONE, fentanNYL, oxyCODONE, sodium chloride flush, sodium chloride, ondansetron **OR** ondansetron, senna  .   SOCIAL HISTORY:     Social History     Socioeconomic History    Marital status:      Spouse name: Not on file    Number of children: Not on file    Years of education: Not on file    Highest education level: Not on file   Occupational History    Not on file   Tobacco Use    Smoking status: Light Smoker     Packs/day: 0.25     Types: Cigarettes, E-Cigarettes     Start date: 2013    Smokeless tobacco: Former     Types: Chew   Vaping Use    Vaping Use: Every day    Substances: Nicotine   Substance and Sexual Activity    Alcohol use: Not Currently     Comment: social    Drug use: Not Currently     Types: Marijuana (Weed)     Comment: no use since MArch 2022    Sexual activity: Not on file   Other Topics Concern    Not on file   Social History Narrative    Not on file     Social Determinants of Health     Financial Resource Strain: Low Risk     Difficulty of Paying Living Expenses: Not hard at all   Food Insecurity: No Food Insecurity    Worried About Running Out of Food in the Last Year: Never true    Ran Out of Food in the Last Year: Never true   Transportation Needs: Not on file   Physical Activity: Not on file   Stress: Not on file   Social Connections: Not on file   Intimate Partner Violence: Not on file   Housing Stability: Not on file       FAMILY HISTORY:   History reviewed. No pertinent family history. REVIEW OF SYSTEMS:     Constitutional: No fever, no chills, no lethargy, no weakness. HEENT:  No headache, otalgia, itchy eyes, nasal discharge or sore throat. Cardiac:  No chest pain, dyspnea, orthopnea or PND. Chest:   No cough, phlegm or wheezing. Abdomen:  No abdominal pain, nausea or vomiting. Neuro:  No focal weakness, abnormal movements or seizure like activity. Skin:   No rashes, no itching. :   No hematuria, no pyuria, no dysuria, no flank pain. Extremities:  No swelling or joint pains. ROS was otherwise negative except as mentioned in the [address] PHYSICAL EXAM:    BP (!) 147/98   Pulse 98   Temp 99.5 °F (37.5 °C) (Oral)   Resp 16   Ht 6' 1\" (1.854 m)   Wt (!) 384 lb 7.7 oz (174.4 kg)   SpO2 94%   BMI 50.73 kg/m²   . TMAX[24]    General: Well developed, Well nourished, No apparent distress  Head:  Normocephalic, Atraumatic  EENT: EOMI, Sclera not icteric, Oropharynx moist  Neck:  Supple, Trachea midline  Lungs:CTA Bilaterally  Heart: RRR, No murmur, No rub, No gallop, PMI nondisplaced. Abdomen:Soft, Non tender, Not distended, BS WNL,  No masses. Ext:No clubbing. No cyanosis. No edema. Skin: No rashes. No jaundice. No stigmata of liver disease.     Neuro:  A&O x Three, No focal neurological deficits    LABS and IMAGING:     Hemotological labs:   ANEMIA STUDIES  No results for input(s): LABIRON, TIBC, FERRITIN, [id number], FOLATE, OCCULTBLD in the last 72 hours. CBC  Recent Labs     07/20/22  1134 07/21/22  1044 07/22/22  0520   WBC 17.3* 20.8* 16.8*   HGB 14.2 15.5 14.5   MCV 77.7* 78.7* 79.1*   RDW 14.1 14.3 14.5    351 270       PT/INR  No results for input(s): PROTIME, INR in the last 72 hours. BMP  Recent Labs     07/20/22  1134 07/21/22  1044 07/22/22  0520    141 139   K 4.2 4.5 4.4    103 101   CO2 26 28 29   BUN 9 13 12   CREATININE 0.61* 0.86 0.93   GLUCOSE 138* 139* 136*   CALCIUM 9.4 9.0 8.9       LIVER WORK UP  Hepatitis Functional Panel:  Recent Labs     07/20/22  1134   ALKPHOS 85   ALT 79*   AST 27   PROT 7.2   BILITOT 0.94   BILIDIR 0.21   LABALBU 4.5       AMYLASE/LIPASE/AMMONIA  No results for input(s): AMYLASE, LIPASE, AMMONIA in the last 72 hours. Acute Hepatitis Panel   No results found for: HEPBSAG, HEPCAB, HEPBIGM, HEPAIGM    HCV Genotype   No results found for: HEPATITISCGENOTYPE    HCV Quantitative   No results found for: HCVQNT    Metabolic work up:  Ceruloplasmin  AA work up:  Alpha 1 antitrypsin   No results found for: A1A  AMA:  No results found for: MITOAB    ASMA:  No results found for: SMOOTHMUSCAB    ANCA:    LASHAE:  No results found for: LASHAE    Anti - Liver/Kidney Ab:  No results found for: LIVER-KIDNEYMICROSOMALAB    Ceruloplasmin:  No results found for: CERULOPLSM    Celiac panel:  No results found for: TISSTRNTIIGG, TTGIGA, IGA    Cancer Markers:  CEA:    No results for input(s): CEA in the last 72 hours. Ca 125:   No results for input(s):  in the last 72 hours. Ca 19-9:     Invalid input(s):   AFP: No results for input(s): AFP in the last 72 hours.      ASSESSMENT and PLAN:     79-year-old male with a past medical history of ADHD, depression, morbid obesity, PTSD, recent cholecystectomy, robotic assisted, fenestrated, performed on July 19, 2022, for symptomatic biliary colic, concern for postoperative bile leak. Evidence of bile output from YOVANY drain. Patient denies any significant abdominal pain or discomfort. No prior hepatobiliary history or significant GI history reported by patient. GI was consulted to further evaluate for ERCP and biliary stent placement for suspected bile leak     IMPRESSION: Bile leak status post successful biliary stent placement via ERCP    Recommendation:  -Clinically much improved, leukocytosis improving, advancement of diet per surgery  - Anticipate further improvement in next 24 to 48 hours  - Patient to require evaluation for stent removal in 2 to 4 weeks post discharge  - Follow-up in GI clinic post discharge within 4 weeks. GI to sign off, do not hesitate to contact our service for any residual questions. Thank you for allowing us to take part in the patients care  Contact GI for any remaining questions, we are available. Electronically signed by:  Lazaro Scherer MD     Gastroenterology  7/22/2022    10:16 AM     his note is created with the assistance of a speech recognition program.  While intending to generate a document that actually reflects the content of the visit, the document can still have some errors including those of syntax and sound a like substitutions which may escape proof reading. It such instances, actual meaning can be extrapolated by contextual diversion.

## 2022-07-22 NOTE — PROGRESS NOTES
[facility]  Office: [address], DO, Svenadriana Gastelumet, DO, Reina Yuan, DO, Sivan Greta Mosley, DO, Hannah Pickett MD, Melonie Gilbert MD, Chris Abreu MD, Alfredo Servin MD,  Tabby Stewart MD, Tristan Diaz MD, Jc Diaz, DO, July Rod MD,  Coni Hanson MD, Daniela Morejon MD, James Conroy, DO, Jessica Navarrete MD, Coni Wong MD, Jaylen Montero MD, Ramona Cates DO, Last Celestin MD, Adriane Booteh MD, Sky Anderson, CNP,  Triny Lindo, CNP, Ernesto Kramer, CNP, John Rivera, CNP, Theresa Cowan PA-C, Zion Trinidad, [facility], Ernestine Armendariz, CNP, Kellen Monsivais, CNP, Yulisa Etienne, [facility], [facility] James, CNP, Hannah Jones, CNS, Armando Nathan, [facility], Maggi Saleem, CNP, Aliya Garcia, CNP, Tamia Deleon, CNP, Indira Hodges, CNP           [facility]    Progress Note    7/22/2022    10:28 AM    Name:   Ashli Watkins  MRN:     [id number]     [place]:      [de-identified]   Room:   [address] [place] [address] Day:  0  Admit Date:  7/19/2022 12:10 PM    PCP:   DIGNA Malloy  Code Status:  Full Code    Subjective:     C/C: \"My belly hurts\"    Interval History Status: significantly improved. Patient is doing much better today with initiation of anti-inflammatories. At this point in time I am going to discontinue his Toradol and transition him over to Motrin. I had a long discussion with the patient regarding his pain medication. Case has been discussed with surgery as well. We are going to advance him to a regular diet and providing he does well with Motrin and oxycodone he could be discharged home later today. Neither he nor his spouse have any questions or concerns at this point in time. Brief History: This very pleasant 68-year-old male presented to hospital with right upper quadrant pain. He was found to have biliary colic and underwent a subtotal cholecystectomy.   Postoperatively he did have a History reviewed. No pertinent family history. Vitals:  BP (!) 147/98   Pulse 98   Temp 99.5 °F (37.5 °C) (Oral)   Resp 16   Ht 6' 1\" (1.854 m)   Wt (!) 384 lb 7.7 oz (174.4 kg)   SpO2 94%   BMI 50.73 kg/m²   Temp (24hrs), Av.2 °F (36.8 °C), Min:97.3 °F (36.3 °C), Max:99.5 °F (37.5 °C)    Recent Labs     22  0930 22  1213   POCGLU 132* 114*       I/O (24Hr): No intake or output data in the 24 hours ending 22 1028    Labs:  Hematology:  Recent Labs     22  1134 22  1044 22  0520   WBC 17.3* 20.8* 16.8*   RBC 5.28 5.80 5.48   HGB 14.2 15.5 14.5   HCT 41.0 45.6 43.4   MCV 77.7* 78.7* 79.1*   MCH 26.9 26.8 26.4   MCHC 34.7 34.1 33.4   RDW 14.1 14.3 14.5    351 270   MPV 7.9 7.8 8.0     Chemistry:  Recent Labs     22  1134 22  1044 22  0520    141 139   K 4.2 4.5 4.4    103 101   CO2 26 28 29   GLUCOSE 138* 139* 136*   BUN 9 13 12   CREATININE 0.61* 0.86 0.93   ANIONGAP 12 10 9   LABGLOM >60 >60 >60   GFRAA >60 >60 >60   CALCIUM 9.4 9.0 8.9     Recent Labs     22  0930 22  1134 22  1213   PROT  --  7.2  --    LABALBU  --  4.5  --    AST  --  27  --    ALT  --  79*  --    ALKPHOS  --  85  --    BILITOT  --  0.94  --    BILIDIR  --  0.21  --    POCGLU 132*  --  114*     ABG:No results found for: POCPH, PHART, PH, POCPCO2, GWM4NDN, PCO2, POCPO2, PO2ART, PO2, POCHCO3, CYR0CPL, HCO3, NBEA, PBEA, BEART, BE, THGBART, THB, BRT0CKK, NHOJ2QFW, G1SSFJSH, O2SAT, FIO2  No results found for: SPECIAL  No results found for: CULTURE    Radiology:  No results found.     Physical Examination:       General appearance:  alert, cooperative and no distress  Mental Status:  oriented to person, place and time and normal affect  Lungs:  clear to auscultation bilaterally, normal effort  Heart:  regular rate and rhythm, no murmur  Abdomen:  soft, nontender, nondistended, normal bowel sounds, no masses, hepatomegaly, splenomegaly  Extremities:

## 2022-07-22 NOTE — PLAN OF CARE
Problem: Pain  Goal: Verbalizes/displays adequate comfort level or baseline comfort level  Outcome: Progressing  Flowsheets (Taken 7/22/2022 0200)  Verbalizes/displays adequate comfort level or baseline comfort level:   Encourage patient to monitor pain and request assistance   Assess pain using appropriate pain scale   Administer analgesics based on type and severity of pain and evaluate response   Implement non-pharmacological measures as appropriate and evaluate response     Problem: Discharge Planning  Goal: Discharge to home or other facility with appropriate resources  Outcome: Progressing  Flowsheets (Taken 7/21/2022 2021)  Discharge to home or other facility with appropriate resources: Identify barriers to discharge with patient and caregiver     Problem: ABCDS Injury Assessment  Goal: Absence of physical injury  Outcome: Progressing  Flowsheets (Taken 7/22/2022 0108)  Absence of Physical Injury: Implement safety measures based on patient assessment

## 2022-07-22 NOTE — PLAN OF CARE
Problem: Discharge Planning  Goal: Discharge to home or other facility with appropriate resources  7/22/2022 1400 by Jessica Malave, RN  Outcome: Completed  7/22/2022 0210 by Stephen Ryder RN  Outcome: Progressing  Flowsheets (Taken 7/21/2022 2021)  Discharge to home or other facility with appropriate resources: Identify barriers to discharge with patient and caregiver   Pt being discharged home

## 2022-07-22 NOTE — PROGRESS NOTES
PATIENT NAME: Amy Kendall     TODAY'S DATE: 7/22/2022, 10:33 AM    SUBJECTIVE:    21 y/o male POD 3 subtotal cholecystectomy doing better today. Post op pain controlled. Tolerating clears with no nausea or vomiting. Denies fevers, chills, or sweats. OBJECTIVE:   VITALS:  BP (!) 147/98   Pulse 98   Temp 99.5 °F (37.5 °C) (Oral)   Resp 16   Ht 6' 1\" (1.854 m)   Wt (!) 384 lb 7.7 oz (174.4 kg)   SpO2 94%   BMI 50.73 kg/m²      INTAKE/OUTPUT:    No intake or output data in the 24 hours ending 07/22/22 1033              CONSTITUTIONAL:  awake, Awake and alert. No acute distress  ABDOMEN:   Abdomen soft, non-tender, non-distended. Incisions are clean, dry, and intact with no drainage and no surrounding erythema. YOVANY drain with minimal serous drainage. Data:  CBC:   Lab Results   Component Value Date/Time    WBC 16.8 07/22/2022 05:20 AM    RBC 5.48 07/22/2022 05:20 AM    HGB 14.5 07/22/2022 05:20 AM    HCT 43.4 07/22/2022 05:20 AM    MCV 79.1 07/22/2022 05:20 AM    MCH 26.4 07/22/2022 05:20 AM    MCHC 33.4 07/22/2022 05:20 AM    RDW 14.5 07/22/2022 05:20 AM     07/22/2022 05:20 AM    MPV 8.0 07/22/2022 05:20 AM       ASSESSMENT   POD 3 subtotal cholecystectomy    Plan  Leukocytosis resolving. Medicine managing antibiotics. Diet as tolerated.   Anticipate discharge this after vs tomorrow am.      Electronically signed by Lynsey Rodriguez IV, DO  on 7/22/2022 at 10:33 AM

## 2022-07-25 ENCOUNTER — TELEPHONE (OUTPATIENT)
Dept: GASTROENTEROLOGY | Age: 22
End: 2022-07-25

## 2022-07-25 NOTE — TELEPHONE ENCOUNTER
Pt wife called and stated pt needs to be seen for a hospital follow up by 8/5. Writer unable to find appropriate appt. Please contact pt wife.

## 2022-07-27 ENCOUNTER — TELEPHONE (OUTPATIENT)
Dept: GASTROENTEROLOGY | Age: 22
End: 2022-07-27

## 2022-07-27 NOTE — TELEPHONE ENCOUNTER
Pt needs a hospital f/u by 8/5/22 Had ERCP done. Please advise where I can add him or if it can be after 8/5/22.

## 2022-09-12 ENCOUNTER — OFFICE VISIT (OUTPATIENT)
Dept: GASTROENTEROLOGY | Age: 22
End: 2022-09-12
Payer: COMMERCIAL

## 2022-09-12 VITALS
WEIGHT: 315 LBS | SYSTOLIC BLOOD PRESSURE: 133 MMHG | HEART RATE: 77 BPM | BODY MASS INDEX: 41.75 KG/M2 | DIASTOLIC BLOOD PRESSURE: 89 MMHG | HEIGHT: 73 IN

## 2022-09-12 DIAGNOSIS — K83.9 BILE LEAK: Primary | ICD-10-CM

## 2022-09-12 PROCEDURE — G8417 CALC BMI ABV UP PARAM F/U: HCPCS | Performed by: INTERNAL MEDICINE

## 2022-09-12 PROCEDURE — 99213 OFFICE O/P EST LOW 20 MIN: CPT | Performed by: INTERNAL MEDICINE

## 2022-09-12 PROCEDURE — 4004F PT TOBACCO SCREEN RCVD TLK: CPT | Performed by: INTERNAL MEDICINE

## 2022-09-12 PROCEDURE — G8428 CUR MEDS NOT DOCUMENT: HCPCS | Performed by: INTERNAL MEDICINE

## 2022-09-12 NOTE — PROGRESS NOTES
Reason for Referral: History of bile leak          Chief Complaint   Patient presents with    Results     ERCP 7/20           HISTORY OF PRESENT ILLNESS: Jason Gupta is a 20 y.o. male with a past history remarkable for morbid obesity, ADHD, prior to depression, status post glossectomy with suspected bile leak, underwent ERCP with biliary stent placement with removal of YOVANY drain recently, referred for evaluation of repeat ERCP with stent removal.      Smoker: Denies  Drinking history: Denies  Illicit drugs: Denies  Abdominal surgeries: Cholecystectomy  Prior Colonoscopy: None recent  Prior EGD: ERCP in July 2022  [facility] of GI issues: Denies      Past Medical,Family, and Social History reviewed and does contribute to the patient presentingcondition. Patient's PMH/PSH,SH,PSYCH Hx, MEDs, ALLERGIES, and ROS were all reviewed and updated in the appropriate sections. PAST MEDICAL HISTORY:  Past Medical History:   Diagnosis Date    ADHD (attention deficit hyperactivity disorder)     no meds since  age 11    Bile leak     Depressed     Morbid obesity ([address]     PTSD (post-traumatic stress disorder)        Past Surgical History:   Procedure Laterality Date    CHOLECYSTECTOMY, LAPAROSCOPIC      robotic    CHOLECYSTECTOMY, LAPAROSCOPIC N/A 07/19/2022    LAPAROSCOPIC ROBOTIC CHOLECYSTECTOMY WITH FIREFLY performed by Tabitha Rodriguez IV, DO at [address] [facility].    ERCP N/A 7/20/2022    ERCP SPHINCTER/PAPILLOTOMY performed by Roxann Askew MD at [facility]    ERCP  7/20/2022    ERCP STENT INSERTION performed by Roxann Askew MD at [address] [facility]  07/20/2022    ERCP SPHINCTER/PAPILLOTOMY    TONSILLECTOMY AND ADENOIDECTOMY         CURRENT MEDICATIONS:    Current Outpatient Medications:     ibuprofen (ADVIL;MOTRIN) 800 MG tablet, Take 1 tablet by mouth in the morning and 1 tablet at noon and 1 tablet in the evening.  (Patient not taking: Reported on 9/12/2022), Disp: 60 tablet, Rfl: 0    docusate sodium (COLACE) 100 MG capsule, Take 1 capsule by mouth 2 times daily as needed for Constipation (take as needed for constipation) (Patient not taking: Reported on 9/12/2022), Disp: 30 capsule, Rfl: 0    ALLERGIES:   No Known Allergies    FAMILY HISTORY: No family history on file. SOCIAL HISTORY:   Social History     Socioeconomic History    Marital status:      Spouse name: Not on file    Number of children: Not on file    Years of education: Not on file    Highest education level: Not on file   Occupational History    Not on file   Tobacco Use    Smoking status: Light Smoker     Packs/day: 0.25     Types: Cigarettes, E-Cigarettes     Start date: 2013    Smokeless tobacco: Former     Types: Chew   Vaping Use    Vaping Use: Every day    Substances: Nicotine   Substance and Sexual Activity    Alcohol use: Not Currently     Comment: social    Drug use: Not Currently     Types: Marijuana [place])     Comment: no use since MArch 2022    Sexual activity: Not on file   Other Topics Concern    Not on file   Social History Narrative    Not on file     Social Determinants of Health     Financial Resource Strain: Low Risk     Difficulty of Paying Living Expenses: Not hard at all   Food Insecurity: No Food Insecurity    Worried About Running Out of Food in the Last Year: Never true    Ran Out of Food in the Last Year: Never true   Transportation Needs: Not on file   Physical Activity: Not on file   Stress: Not on file   Social Connections: Not on file   Intimate Partner Violence: Not on file   Housing Stability: Not on file         REVIEW OF SYSTEMS: A 12-point review of systems was obtained and pertinent positives and negatives were listed below. REVIEW OF SYSTEMS:     Constitutional: No fever, no chills, no lethargy, no weakness. HEENT:  No headache, otalgia, itchy eyes, nasal discharge or sore throat. Cardiac:  No chest pain, dyspnea, orthopnea or PND. Chest:   No cough, phlegm or wheezing.   Abdomen: Detailed by MA   Neuro:  No focal weakness, abnormal movements or seizure like activity. Skin:   No rashes, no itching. :   No hematuria, no pyuria, no dysuria, no flank pain. Extremities:  No swelling or joint pains. ROS was otherwise negative    Review of Systems    PHYSICAL EXAMINATION: Vital signs reviewed per the nursing documentation. /89   Pulse 77   Ht 6' 1\" (1.854 m)   Wt (!) 387 lb (175.5 kg)   BMI 51.06 kg/m²   Body mass index is 51.06 kg/m². Physical Exam    Physical Exam   Constitutional: Patient is oriented to person, place, and time. Patient appears well-developed and well-nourished. HENT:   Head: Normocephalic and atraumatic. Eyes: Pupils are equal, round, and reactive to light. EOM are normal.   Neck: Normal range of motion. Neck supple. No JVD present. No tracheal deviation present. No thyromegaly present. Cardiovascular: Normal rate, regular rhythm, normal heart sounds and intact distal pulses. Pulmonary/Chest: Effort normal and breath sounds normal. No stridor. No respiratory distress. He has no wheezes. He has no rales. He exhibits no tenderness. Abdominal: Soft. Bowel sounds are normal. He exhibits no distension and no mass. There is no tenderness. There is no rebound and no guarding. No hernia. Musculoskeletal: Normal range of motion. Lymphadenopathy:    Patient has no cervical adenopathy. Neurological: Patient is alert and oriented to person, place, and time. Psychiatric: Patient has a normal mood and affect.  Patient behavior is normal.       LABORATORY DATA: Reviewed  Lab Results   Component Value Date    WBC 16.8 (H) 07/22/2022    HGB 14.5 07/22/2022    HCT 43.4 07/22/2022    MCV 79.1 (L) 07/22/2022     07/22/2022     07/22/2022    K 4.4 07/22/2022     07/22/2022    CO2 29 07/22/2022    BUN 12 07/22/2022    CREATININE 0.93 07/22/2022    LABALBU 4.5 07/20/2022    BILITOT 0.94 07/20/2022    ALKPHOS 85 07/20/2022    AST 27 07/20/2022 ALT 79 (H) 07/20/2022         Lab Results   Component Value Date    RBC 5.48 07/22/2022    HGB 14.5 07/22/2022    MCV 79.1 (L) 07/22/2022    MCH 26.4 07/22/2022    MCHC 33.4 07/22/2022    RDW 14.5 07/22/2022    MPV 8.0 07/22/2022    BASOPCT 1 07/06/2022    LYMPHSABS 3.30 07/06/2022    MONOSABS 0.90 07/06/2022    NEUTROABS 9.10 (H) 07/06/2022    EOSABS 0.40 07/06/2022    BASOSABS 0.10 07/06/2022         DIAGNOSTIC TESTING:     No results found. IMPRESSION: Augustine Tejada is a 20 y.o. male with a past history remarkable for morbid obesity, ADHD, prior to depression, status post glossectomy with suspected bile leak, underwent ERCP with biliary stent placement with removal of YOVANY drain recently, referred for evaluation of repeat ERCP with stent removal.    Assessment  1. Bile leak        Mirlande Montes was seen today for results. Diagnoses and all orders for this visit:    Bile leak-YOVANY drain removed, minimize completely, clinically doing well from hepatobiliary standpoint. Plan for repeat ERCP with biliary stent removal.  Repeat LFTs prior to procedure. Risks, benefits, terms discussed with the patient. He agreed to proceed with the procedure. -     Hepatic Function Panel; Future  -     ERCP; Future           RTC: 3 months. Additional comments: Thank you for allowing me to participate in the care of Mr. Jodie Abrams. For any further questions please do not hesitate to contact me. I have reviewed and agree with the MA/LPN ROS please refer to their documentation from today's encounter on a separate note.      Mara Calabrese MD, MPH   Board Certified in Gastroenterology  Board Certified in [address] [place] [address] #: [phone]          this note is created with the assistance of a speech recognition program.  While intending to generate a document that actually reflects the content of the visit, the document can still have some errors including those of syntax and sound a like substitutions which may

## 2022-09-13 ENCOUNTER — TELEPHONE (OUTPATIENT)
Dept: GASTROENTEROLOGY | Age: 22
End: 2022-09-13

## 2022-10-10 ENCOUNTER — ANESTHESIA EVENT (OUTPATIENT)
Dept: OPERATING ROOM | Age: 22
End: 2022-10-10
Payer: COMMERCIAL

## 2022-10-12 ENCOUNTER — APPOINTMENT (OUTPATIENT)
Dept: GENERAL RADIOLOGY | Age: 22
End: 2022-10-12
Attending: INTERNAL MEDICINE
Payer: COMMERCIAL

## 2022-10-12 ENCOUNTER — ANESTHESIA (OUTPATIENT)
Dept: OPERATING ROOM | Age: 22
End: 2022-10-12
Payer: COMMERCIAL

## 2022-10-12 ENCOUNTER — HOSPITAL ENCOUNTER (OUTPATIENT)
Age: 22
Setting detail: OUTPATIENT SURGERY
Discharge: HOME | End: 2022-10-12
Attending: INTERNAL MEDICINE | Admitting: INTERNAL MEDICINE
Payer: COMMERCIAL

## 2022-10-12 VITALS
RESPIRATION RATE: 26 BRPM | HEART RATE: 90 BPM | DIASTOLIC BLOOD PRESSURE: 90 MMHG | BODY MASS INDEX: 41.75 KG/M2 | WEIGHT: 315 LBS | TEMPERATURE: 97 F | OXYGEN SATURATION: 93 % | SYSTOLIC BLOOD PRESSURE: 160 MMHG | HEIGHT: 73 IN

## 2022-10-12 PROBLEM — K91.89 POSTOPERATIVE BILE LEAK: Status: ACTIVE | Noted: 2022-07-20

## 2022-10-12 PROBLEM — K83.8 POSTOPERATIVE BILE LEAK: Status: ACTIVE | Noted: 2022-07-20

## 2022-10-12 PROCEDURE — 6360000002 HC RX W HCPCS: Performed by: NURSE ANESTHETIST, CERTIFIED REGISTERED

## 2022-10-12 PROCEDURE — 7100000000 HC PACU RECOVERY - FIRST 15 MIN: Performed by: INTERNAL MEDICINE

## 2022-10-12 PROCEDURE — 2709999900 HC NON-CHARGEABLE SUPPLY: Performed by: INTERNAL MEDICINE

## 2022-10-12 PROCEDURE — 6360000004 HC RX CONTRAST MEDICATION: Performed by: INTERNAL MEDICINE

## 2022-10-12 PROCEDURE — 3209999900 FLUORO FOR SURGICAL PROCEDURES

## 2022-10-12 PROCEDURE — 7100000010 HC PHASE II RECOVERY - FIRST 15 MIN: Performed by: INTERNAL MEDICINE

## 2022-10-12 PROCEDURE — 2500000003 HC RX 250 WO HCPCS: Performed by: NURSE ANESTHETIST, CERTIFIED REGISTERED

## 2022-10-12 PROCEDURE — 3609015000 HC ERCP REMOVE FOREIGN BODY/STENT BILIARY/PANC DUCT: Performed by: INTERNAL MEDICINE

## 2022-10-12 PROCEDURE — 3700000000 HC ANESTHESIA ATTENDED CARE: Performed by: INTERNAL MEDICINE

## 2022-10-12 PROCEDURE — 2720000010 HC SURG SUPPLY STERILE: Performed by: INTERNAL MEDICINE

## 2022-10-12 PROCEDURE — 7100000001 HC PACU RECOVERY - ADDTL 15 MIN: Performed by: INTERNAL MEDICINE

## 2022-10-12 PROCEDURE — A4216 STERILE WATER/SALINE, 10 ML: HCPCS | Performed by: INTERNAL MEDICINE

## 2022-10-12 PROCEDURE — 2580000003 HC RX 258: Performed by: ANESTHESIOLOGY

## 2022-10-12 PROCEDURE — C1769 GUIDE WIRE: HCPCS | Performed by: INTERNAL MEDICINE

## 2022-10-12 PROCEDURE — 43276 ERCP STENT EXCHANGE W/DILATE: CPT | Performed by: INTERNAL MEDICINE

## 2022-10-12 PROCEDURE — 2580000003 HC RX 258: Performed by: INTERNAL MEDICINE

## 2022-10-12 PROCEDURE — 2580000003 HC RX 258: Performed by: NURSE ANESTHETIST, CERTIFIED REGISTERED

## 2022-10-12 PROCEDURE — 3700000001 HC ADD 15 MINUTES (ANESTHESIA): Performed by: INTERNAL MEDICINE

## 2022-10-12 RX ORDER — SODIUM CHLORIDE, SODIUM LACTATE, POTASSIUM CHLORIDE, CALCIUM CHLORIDE 600; 310; 30; 20 MG/100ML; MG/100ML; MG/100ML; MG/100ML
INJECTION, SOLUTION INTRAVENOUS CONTINUOUS
Status: DISCONTINUED | OUTPATIENT
Start: 2022-10-12 | End: 2022-10-12 | Stop reason: HOSPADM

## 2022-10-12 RX ORDER — ROCURONIUM BROMIDE 10 MG/ML
INJECTION, SOLUTION INTRAVENOUS PRN
Status: DISCONTINUED | OUTPATIENT
Start: 2022-10-12 | End: 2022-10-12 | Stop reason: SDUPTHER

## 2022-10-12 RX ORDER — SUCCINYLCHOLINE/SOD CL,ISO/PF 100 MG/5ML
SYRINGE (ML) INTRAVENOUS PRN
Status: DISCONTINUED | OUTPATIENT
Start: 2022-10-12 | End: 2022-10-12 | Stop reason: SDUPTHER

## 2022-10-12 RX ORDER — SODIUM CHLORIDE 0.9 % (FLUSH) 0.9 %
5-40 SYRINGE (ML) INJECTION EVERY 12 HOURS SCHEDULED
Status: DISCONTINUED | OUTPATIENT
Start: 2022-10-12 | End: 2022-10-12 | Stop reason: HOSPADM

## 2022-10-12 RX ORDER — DEXAMETHASONE SODIUM PHOSPHATE 10 MG/ML
INJECTION, SOLUTION INTRAMUSCULAR; INTRAVENOUS PRN
Status: DISCONTINUED | OUTPATIENT
Start: 2022-10-12 | End: 2022-10-12 | Stop reason: SDUPTHER

## 2022-10-12 RX ORDER — MIDAZOLAM HYDROCHLORIDE 1 MG/ML
INJECTION INTRAMUSCULAR; INTRAVENOUS PRN
Status: DISCONTINUED | OUTPATIENT
Start: 2022-10-12 | End: 2022-10-12 | Stop reason: SDUPTHER

## 2022-10-12 RX ORDER — ONDANSETRON 2 MG/ML
INJECTION INTRAMUSCULAR; INTRAVENOUS PRN
Status: DISCONTINUED | OUTPATIENT
Start: 2022-10-12 | End: 2022-10-12 | Stop reason: SDUPTHER

## 2022-10-12 RX ORDER — SODIUM CHLORIDE 9 MG/ML
INJECTION, SOLUTION INTRAVENOUS CONTINUOUS
Status: DISCONTINUED | OUTPATIENT
Start: 2022-10-12 | End: 2022-10-12 | Stop reason: HOSPADM

## 2022-10-12 RX ORDER — SODIUM CHLORIDE 9 MG/ML
INJECTION INTRAVENOUS PRN
Status: DISCONTINUED | OUTPATIENT
Start: 2022-10-12 | End: 2022-10-12 | Stop reason: ALTCHOICE

## 2022-10-12 RX ORDER — SODIUM CHLORIDE 0.9 % (FLUSH) 0.9 %
5-40 SYRINGE (ML) INJECTION PRN
Status: DISCONTINUED | OUTPATIENT
Start: 2022-10-12 | End: 2022-10-12 | Stop reason: HOSPADM

## 2022-10-12 RX ORDER — PROPOFOL 10 MG/ML
INJECTION, EMULSION INTRAVENOUS PRN
Status: DISCONTINUED | OUTPATIENT
Start: 2022-10-12 | End: 2022-10-12 | Stop reason: SDUPTHER

## 2022-10-12 RX ORDER — LIDOCAINE HYDROCHLORIDE 10 MG/ML
INJECTION, SOLUTION INFILTRATION; PERINEURAL PRN
Status: DISCONTINUED | OUTPATIENT
Start: 2022-10-12 | End: 2022-10-12 | Stop reason: SDUPTHER

## 2022-10-12 RX ORDER — SODIUM CHLORIDE 9 MG/ML
INJECTION, SOLUTION INTRAVENOUS PRN
Status: DISCONTINUED | OUTPATIENT
Start: 2022-10-12 | End: 2022-10-12 | Stop reason: HOSPADM

## 2022-10-12 RX ORDER — FENTANYL CITRATE 50 UG/ML
INJECTION, SOLUTION INTRAMUSCULAR; INTRAVENOUS PRN
Status: DISCONTINUED | OUTPATIENT
Start: 2022-10-12 | End: 2022-10-12 | Stop reason: SDUPTHER

## 2022-10-12 RX ORDER — SODIUM CHLORIDE, SODIUM LACTATE, POTASSIUM CHLORIDE, CALCIUM CHLORIDE 600; 310; 30; 20 MG/100ML; MG/100ML; MG/100ML; MG/100ML
INJECTION, SOLUTION INTRAVENOUS CONTINUOUS PRN
Status: DISCONTINUED | OUTPATIENT
Start: 2022-10-12 | End: 2022-10-12 | Stop reason: SDUPTHER

## 2022-10-12 RX ORDER — SODIUM CHLORIDE 9 MG/ML
INJECTION INTRAVENOUS
Status: DISCONTINUED
Start: 2022-10-12 | End: 2022-10-12 | Stop reason: HOSPADM

## 2022-10-12 RX ADMIN — ONDANSETRON 4 MG: 2 INJECTION INTRAMUSCULAR; INTRAVENOUS at 10:19

## 2022-10-12 RX ADMIN — PROPOFOL 50 MG: 10 INJECTION, EMULSION INTRAVENOUS at 10:31

## 2022-10-12 RX ADMIN — SODIUM CHLORIDE, POTASSIUM CHLORIDE, SODIUM LACTATE AND CALCIUM CHLORIDE: 600; 310; 30; 20 INJECTION, SOLUTION INTRAVENOUS at 10:19

## 2022-10-12 RX ADMIN — PROPOFOL 220 MG: 10 INJECTION, EMULSION INTRAVENOUS at 10:23

## 2022-10-12 RX ADMIN — DEXAMETHASONE SODIUM PHOSPHATE 8 MG: 10 INJECTION, SOLUTION INTRAMUSCULAR; INTRAVENOUS at 10:27

## 2022-10-12 RX ADMIN — LIDOCAINE HYDROCHLORIDE 80 MG: 10 INJECTION, SOLUTION INFILTRATION; PERINEURAL at 10:21

## 2022-10-12 RX ADMIN — FENTANYL CITRATE 50 MCG: 50 INJECTION, SOLUTION INTRAMUSCULAR; INTRAVENOUS at 10:16

## 2022-10-12 RX ADMIN — SODIUM CHLORIDE, POTASSIUM CHLORIDE, SODIUM LACTATE AND CALCIUM CHLORIDE: 600; 310; 30; 20 INJECTION, SOLUTION INTRAVENOUS at 09:15

## 2022-10-12 RX ADMIN — MIDAZOLAM 2 MG: 1 INJECTION INTRAMUSCULAR; INTRAVENOUS at 10:16

## 2022-10-12 RX ADMIN — SUGAMMADEX 350 MG: 100 INJECTION, SOLUTION INTRAVENOUS at 11:23

## 2022-10-12 RX ADMIN — FENTANYL CITRATE 50 MCG: 50 INJECTION, SOLUTION INTRAMUSCULAR; INTRAVENOUS at 10:19

## 2022-10-12 RX ADMIN — Medication 100 MG: at 10:23

## 2022-10-12 RX ADMIN — ROCURONIUM BROMIDE 25 MG: 10 INJECTION INTRAVENOUS at 10:27

## 2022-10-12 ASSESSMENT — PAIN DESCRIPTION - DESCRIPTORS
DESCRIPTORS: ACHING
DESCRIPTORS: SORE
DESCRIPTORS: SORE

## 2022-10-12 ASSESSMENT — PAIN SCALES - GENERAL
PAINLEVEL_OUTOF10: 6

## 2022-10-12 ASSESSMENT — PAIN - FUNCTIONAL ASSESSMENT
PAIN_FUNCTIONAL_ASSESSMENT: ACTIVITIES ARE NOT PREVENTED
PAIN_FUNCTIONAL_ASSESSMENT: ACTIVITIES ARE NOT PREVENTED
PAIN_FUNCTIONAL_ASSESSMENT: 0-10

## 2022-10-12 ASSESSMENT — PAIN DESCRIPTION - PAIN TYPE: TYPE: ACUTE PAIN

## 2022-10-12 ASSESSMENT — PAIN DESCRIPTION - LOCATION
LOCATION: THROAT

## 2022-10-12 NOTE — ANESTHESIA PRE PROCEDURE
Department of Anesthesiology  Preprocedure Note       Name:  Roberto Vera   Age:  20 y.o.  :  2000                                          MRN:  [id number]         Date:  10/12/2022      Surgeon: Faustino Gill):  Inez Johnson MD    Procedure: Procedure(s):  ERCP DIAGNOSTIC    Medications prior to admission:   Prior to Admission medications    Medication Sig Start Date End Date Taking? Authorizing Provider   ibuprofen (ADVIL;MOTRIN) 800 MG tablet Take 1 tablet by mouth in the morning and 1 tablet at noon and 1 tablet in the evening.   Patient not taking: No sig reported 22   Rosaura Nando Canos IV, DO   docusate sodium (COLACE) 100 MG capsule Take 1 capsule by mouth 2 times daily as needed for Constipation (take as needed for constipation)  Patient not taking: No sig reported 22   Rosaura Nando Canos IV, DO       Current medications:    Current Facility-Administered Medications   Medication Dose Route Frequency Provider Last Rate Last Admin    0.9 % sodium chloride infusion   IntraVENous Continuous Karthik Bell MD        lactated ringers infusion   IntraVENous Continuous Katrhik Bell MD        sodium chloride flush 0.9 % injection 5-40 mL  5-40 mL IntraVENous 2 times per day Karthik Bell MD        sodium chloride flush 0.9 % injection 5-40 mL  5-40 mL IntraVENous PRN Karthik Bell MD        0.9 % sodium chloride infusion   IntraVENous PRN Karthik Bell MD           Allergies:  No Known Allergies    Problem List:    Patient Active Problem List   Diagnosis Code    Right upper quadrant abdominal pain R10.11    Status post cholecystectomy Z90.49    Postoperative pain G89.18    Bile leak K83.9    Leukocytosis D72.829       Past Medical History:        Diagnosis Date    ADHD (attention deficit hyperactivity disorder)     no meds since  age 11    Bile leak     Depressed     Fatty liver     hepatomegaly    Morbid obesity ([facility]     PTSD (post-traumatic stress disorder)        Past Surgical History:        Procedure Laterality Date    CHOLECYSTECTOMY, LAPAROSCOPIC      robotic    CHOLECYSTECTOMY, LAPAROSCOPIC N/A 2022    LAPAROSCOPIC ROBOTIC CHOLECYSTECTOMY WITH FIREFLY performed by Rene Rodriguez IV, DO at [address] [facility] ERCP N/A 2022    ERCP SPHINCTER/PAPILLOTOMY performed by Jody Curtis MD at [address] ERCP  2022    ERCP STENT INSERTION performed by Jody Curtis MD at [address] ERCP W/ SPHICTEROTOMY  2022    ERCP SPHINCTER/PAPILLOTOMY    TONSILLECTOMY AND ADENOIDECTOMY         Social History:    Social History     Tobacco Use    Smoking status: Former     Packs/day: 0.25     Types: Cigarettes, E-Cigarettes     Start date:      Quit date: 2022     Years since quittin.1    Smokeless tobacco: Former     Types: Chew   Substance Use Topics    Alcohol use: Not Currently     Comment: social                                Counseling given: Not Answered      Vital Signs (Current):   Vitals:    10/03/22 1337   Weight: (!) 375 lb (170.1 kg)   Height: 6' 1\" (1.854 m)                                              BP Readings from Last 3 Encounters:   22 133/89   22 (!) 152/87   22 (!) 157/89       NPO Status: Time of last liquid consumption:                         Time of last solid consumption:                         Date of last liquid consumption: 10/11/22                        Date of last solid food consumption: 10/11/22    BMI:   Wt Readings from Last 3 Encounters:   10/03/22 (!) 375 lb (170.1 kg)   22 (!) 387 lb (175.5 kg)   22 (!) 384 lb 7.7 oz (174.4 kg)     Body mass index is 49.48 kg/m².     CBC:   Lab Results   Component Value Date/Time    WBC 16.8 2022 05:20 AM    RBC 5.48 2022 05:20 AM    HGB 14.5 2022 05:20 AM    HCT 43.4 2022 05:20 AM    MCV 79.1 2022 05:20 AM    RDW 14.5 2022 05:20 AM     2022 05:20 AM       CMP:   Lab Results   Component Value Date/Time     2022 05:20 AM    K 4.4 07/22/2022 05:20 AM     07/22/2022 05:20 AM    CO2 29 07/22/2022 05:20 AM    BUN 12 07/22/2022 05:20 AM    CREATININE 0.93 07/22/2022 05:20 AM    GFRAA >60 07/22/2022 05:20 AM    LABGLOM >60 07/22/2022 05:20 AM    GLUCOSE 136 07/22/2022 05:20 AM    PROT 7.2 07/20/2022 11:34 AM    CALCIUM 8.9 07/22/2022 05:20 AM    BILITOT 0.94 07/20/2022 11:34 AM    ALKPHOS 85 07/20/2022 11:34 AM    AST 27 07/20/2022 11:34 AM    ALT 79 07/20/2022 11:34 AM       POC Tests: No results for input(s): POCGLU, POCNA, POCK, POCCL, POCBUN, POCHEMO, POCHCT in the last 72 hours. Coags: No results found for: PROTIME, INR, APTT    HCG (If Applicable): No results found for: PREGTESTUR, PREGSERUM, HCG, HCGQUANT     ABGs: No results found for: PHART, PO2ART, UJP7PWA, UDZ5PNY, BEART, D1BZTXIX     Type & Screen (If Applicable):  No results found for: LABABO, LABRH    Drug/Infectious Status (If Applicable):  No results found for: HIV, HEPCAB    COVID-19 Screening (If Applicable): No results found for: COVID19        Anesthesia Evaluation  Patient summary reviewed and Nursing notes reviewed no history of anesthetic complications:   Airway: Mallampati: III  TM distance: >3 FB   Neck ROM: full  Mouth opening: > = 3 FB   Dental: normal exam         Pulmonary:Negative Pulmonary ROS and normal exam  breath sounds clear to auscultation                             Cardiovascular:Negative CV ROS            Rhythm: regular  Rate: normal                    Neuro/Psych:   (+) psychiatric history:             ROS comment: ADHD, PTSD GI/Hepatic/Renal:   (+) liver disease:, morbid obesity         ROS comment: Bile leak. Endo/Other: Negative Endo/Other ROS                    Abdominal:   (+) obese,     Abdomen: soft. Vascular: negative vascular ROS. Other Findings:           Anesthesia Plan      general     ASA 3     (GETA, Glidescope intubation)  Induction: intravenous.       Anesthetic plan and risks discussed with patient. Plan discussed with CRNA.                     Elizabeth Todd MD   10/12/2022

## 2022-10-12 NOTE — H&P
Procedure History and Physical    Pre-Procedural Diagnosis:  prior history of bile leak    Indications:  same    Procedure Planned: ERCP with stent removal    History Obtained From:  patient    HISTORY OF PRESENT ILLNESS:       The patient is a 20 y.o. male who presents for the above procedure.         Past Medical History:    Past Medical History:   Diagnosis Date    ADHD (attention deficit hyperactivity disorder)     no meds since  age 11    Bile leak     Depressed     Fatty liver     hepatomegaly    Morbid obesity ([address]     PTSD (post-traumatic stress disorder)        Past Surgical History:    Past Surgical History:   Procedure Laterality Date    CHOLECYSTECTOMY, LAPAROSCOPIC      robotic    CHOLECYSTECTOMY, LAPAROSCOPIC N/A 07/19/2022    LAPAROSCOPIC ROBOTIC CHOLECYSTECTOMY WITH FIREFLY performed by Janel Rodriguez IV, DO at [address] [facility].    ERCP N/A 7/20/2022    ERCP SPHINCTER/PAPILLOTOMY performed by Lady Arnulfo MD at [facility]    ERCP  7/20/2022    ERCP STENT INSERTION performed by Lady Arnulfo MD at [address] [facility]  07/20/2022    ERCP SPHINCTER/PAPILLOTOMY    TONSILLECTOMY AND ADENOIDECTOMY         Medications:  Current Facility-Administered Medications   Medication Dose Route Frequency Provider Last Rate Last Admin    0.9 % sodium chloride infusion   IntraVENous Continuous Brea Loomis MD        lactated ringers infusion   IntraVENous Continuous Brea Loomis  mL/hr at 10/12/22 0915 New Bag at 10/12/22 0915    sodium chloride flush 0.9 % injection 5-40 mL  5-40 mL IntraVENous 2 times per day Brea Loomis MD        sodium chloride flush 0.9 % injection 5-40 mL  5-40 mL IntraVENous PRN Brea Loomis MD        0.9 % sodium chloride infusion   IntraVENous PRN Brea Loomis MD           Allergies:   No Known Allergies              Social   Social History     Tobacco Use    Smoking status: Former     Packs/day: 0.25     Types: Cigarettes, E-Cigarettes     Start date: 2013     Quit date: 2022     Years since quittin.1    Smokeless tobacco: Former     Types: Chew   Substance Use Topics    Alcohol use: Not Currently     Comment: social        PSYCH HISTORY:  Depression No  Anxiety No  Suicide No       History reviewed. No pertinent family history. No family history of colon cancer, Crohn's disease, or ulcerative colitis    Problems with Sedation/Anesthesia in the past? no    REVIEW OF SYSTEMS:  12 point review of systems negative other than mentioned above. PHYSICAL EXAM:    Vitals:  BP (!) 152/99   Pulse 84   Temp 97.3 °F (36.3 °C) (Skin)   Resp 18   Ht 6' 1\" (1.854 m)   Wt (!) 384 lb 12.8 oz (174.5 kg)   SpO2 98%   BMI 50.77 kg/m²     Focused Exam related to procedure:    General appearance: NAD, conversant   Eyes: anicteric sclerae, moist conjunctivae; no lid-lag; PERRLA   Lungs: CTA, with normal respiratory effort and no intercostal retractions   CV: RRR, no MRGs   Abdomen: Soft, non-tender; no masses or HSM   Skin: Normal temperature, turgor and texture; no rash, ulcers or subcutaneous nodules     DATA:  CBC:   Lab Results   Component Value Date    WBC 16.8 (H) 2022    HGB 14.5 2022    HCT 43.4 2022    MCV 79.1 (L) 2022     2022     BUN/Cr:   Lab Results   Component Value Date    BUN 12 2022   ,   Lab Results   Component Value Date    CREATININE 0.93 2022     Potassium:   Lab Results   Component Value Date    K 4.4 2022     PT/INR: No results found for: INR, PROTIME    ASSESSMENT AND PLAN:       1. Patient is a 20 y.o. male with above specified procedure planned. Expected Sedation/Anesthesia Type: General     2. ASA ([address] Anesthesiology) Anesthesia Status: Class 3 - A patient with severe systemic disease that limits activity but is not incapacitating    3. Mallampati: III (soft palate, base of uvula visible)  4. Procedure options, risks and benefits reviewed with Patient.   Patient expresses understanding.     5.  Consent has been signed:  Yes    Gabo Olivera MD

## 2022-10-12 NOTE — DISCHARGE INSTRUCTIONS
No alcoholic beverages, no driving or operating machinery, no making important decisions for 24 hours. You may have a normal diet but should eat lightly day of surgery. Drink plenty of fluids. Urinate within 8 hours after surgery, if unable to urinate call your doctor   Call Dr Ty Love office in the morning to schedule follow up appointment.   Low fat diet

## 2022-10-12 NOTE — OP NOTE
Operative Note      Patient: Janet Farfan  YOB: 2000  MRN: [id number]    Date of Procedure: 10/12/2022    Pre-Op Diagnosis: Bile leak [K83.9]    Post-Op Diagnosis: Successful ERCP with biliary stent removal. No obvious evidence of bile leak with cholangiogram.        Procedure(s):  ERCP STENT REMOVAL AND CHOLANGIOGRAM    Surgeon(s):  Lalo Oakes MD    Assistant:   * No surgical staff found *    Anesthesia: General    Estimated Blood Loss (mL): Minimal    Complications: None    Specimens:   * No specimens in log *    Implants:  * No implants in log *      Drains: * No LDAs found *              ERCP    PROCEDURE DATE: 10/12/22    REFERRING PHYSICIAN: No ref. provider found     PRIMARY CARE PROVIDER: DIGNA Sanchez    ATTENDING PHYSICIAN: Lalo Oakes MD     HISTORY: Mr. Janet Farfan is a 20 y.o. male who presents to the  OR unit for ERCP. The patient's clinical history is remarkable for history of morbid obesity, CCY, bile leak, s/p ERCP with stent placement, scheduled for stent removal. He is currently medically stable and appropriate for the planned procedure. PROCEDURES PERFORMED:   1. Transoral Endoscopic retrograde cholangiopancreatography (ERCP). 2. Cholangiogram  3. Stent removal     POSTPROCEDURE DIAGNOSIS:    1) Successful ERCP with biliary stent removal  2) No residual bile leak on cholangiogram    INSTRUMENTS:   1. Olympus TJF-180VF flexible duodenoscope. 2. 44 Rx Sphincterotome  3. Small flexible snare   4. 9-12 mm biliary balloon       MEDICATIONS:   General endotracheal anesthesia      EBL: 10<cc    FLUOROSCOPY TIME:  2 minutes     PREPARATION: After the risks, benefits and alternatives of the procedure were discussed, informed consent was obtained.  Complications were said to include, but were not limited to: medication allergy, medication reaction, cardiovascular and respiratory problems, bleeding, perforation, infection, pancreatitis, aspiration, and/or missed diagnosis. Following arrival in the endoscopy room, the patient was placed in the supine position and a final time-out was performed in the presence of the nursing staff. The patient was then sedated and intubated, and the examination was started. FINDINGS: The duodenoscope was inserted into the oropharynx under direct visualization and advanced smoothly into the stomach where a small gastric pool was suctioned. A cursory view of the gastric mucosa was normal. The scope was then further advanced through a patent pylorus to the second portion of the duodenum. The major ampulla was visualized and appeared consistent with a prior biliary sphincterotomy with biliary stent exiting the orifice. Biliary stent was grasped with small flexible snare and retrieved. Access was then gained into the common bile duct using a [employer] sphincterotome 44 Rx preloaded with a 0.035\" guidewire. A cholangiogram was performed with 912 biliary balloon which failed to reveal any obvious bile leak. Final fluoroscopic views of the hemidiaphragm revealed no air. The patient was extubated and returned to the recovery area in good condition having tolerated the procedure well. RECOMMENDATIONS:   1.  Okay to resume regular diet (low fat diet). Follow-up in GI office for postprocedural care.           Wilma Chavarria MD, MPH  Gastroenterology        Electronically signed by Wilma Chavarria MD on 10/12/2022 at 11:32 AM

## 2022-10-12 NOTE — ANESTHESIA POSTPROCEDURE EVALUATION
POST- ANESTHESIA EVALUATION       Pt Name: Ema Cortez  MRN: [id number]  [place]: 2000  Date of evaluation: 10/12/2022  Time:  12:55 PM      BP (!) 160/90   Pulse 90   Temp 97 °F (36.1 °C) (Temporal)   Resp 26   Ht 6' 1\" (1.854 m)   Wt (!) 384 lb 12.8 oz (174.5 kg)   SpO2 93%   BMI 50.77 kg/m²      Consciousness Level  Awake  Cardiopulmonary Status  Stable  Pain Adequately Treated YES  Nausea / Vomiting  NO  Adequate Hydration  YES  Anesthesia Related Complications NONE      Electronically signed by Pamela Green MD on 10/12/2022 at 12:55 PM       Department of Anesthesiology  Postprocedure Note    Patient: Ema Cortez  MRN: [id number]  YOB: 2000  Date of evaluation: 10/12/2022      Procedure Summary     Date: 10/12/22 Room / Location: [address]    Anesthesia Start: 6011 Anesthesia Stop: 5732    Procedure: ERCP STENT REMOVAL AND CHOLANGIOGRAM Diagnosis:       Bile leak      (Bile leak [K83.9])    Surgeons: Shanti Green MD Responsible Provider: Pamela Green MD    Anesthesia Type: general ASA Status: 3          Anesthesia Type: No value filed.     Vanessa Phase I: Vanessa Score: 10    Vanessa Phase II: Vanessa Score: 10      Anesthesia Post Evaluation

## 2023-08-02 NOTE — OP NOTE
Aletha Grove was seen alone on 08/02/2023 for an audiological evaluation.      Otoscopy revealed clear view of both external ear canals and tympanic membranes.  No obstructive cerumen present in either ear canal.   Right TM abnormal.      Audiogram results revealed a mild-to-moderate conductive hearing loss for the right ear and normal hearing for the left ear.  Speech Reception Thresholds were 40 dBHL for the right ear and 10 dBHL for the left ear.  Word recognition scores were excellent for the right ear and excellent for the left ear.   Tympanograms were Type B for the right ear and Type A for the left ear.    Audiogram results were reviewed in detail with patient and all questions were answered. Results will be reviewed by ENT at the completion of this note.     Rec. F/u with ENT to discuss treatment options for the right ear with repeat audiogram to follow to determine candidacy for amplification.               Operative Note      Patient: Chele Benson  YOB: 2000  MRN: [id number]    Date of Procedure: 7/20/2022    Pre-Op Diagnosis: BILE LEAK    Post-Op Diagnosis: Successful Biliary cannulation, sphincterotomy and biliary stent placement       Procedure(s):  ERCP SPHINCTER/PAPILLOTOMY  ERCP STENT INSERTION    Surgeon(s):  MD Richard Dallas MD    Assistant:   * No surgical staff found *    Anesthesia: General    Estimated Blood Loss (mL): Minimal    Complications: None    Specimens:   * No specimens in log *    Implants:  Implant Name Type Inv. Item Serial No.  Lot No. LRB No. Used Action   STENT BILI 7FR L9CM PLAS DUODENAL BEND RAP EXCHG PRE - [id number]  STENT BILI 7FR L9CM PLAS DUODENAL BEND RAP Lm Don [employer] [id number] N/A 1 Implanted         Drains:   Closed/Suction Drain RUQ Bulb (Active)   Site Description Intact 07/20/22 1507   Dressing Status Old drainage noted 07/20/22 1507   Drainage Appearance Yellow 07/20/22 1507   Drain Status Compressed 07/20/22 1507   Output (ml) 60 ml 07/20/22 1507                 ERCP    PROCEDURE DATE: 07/20/22    REFERRING PHYSICIAN: No ref. provider found     PRIMARY CARE PROVIDER: DIGNA Sharma    ATTENDING PHYSICIAN: Richard Delcid MD, Dandre Bullard MD      HISTORY: Mr. Chele Benson is a 20 y.o. male who presents to the [place] [address] Endoscopy unit for ERCP. The patient's clinical history is remarkable for recent cholecystectomy, fenestrated, suspected bile leak, referred for ERCP. He is currently medically stable and appropriate for the planned procedure. PROCEDURES PERFORMED:   1. Transoral Endoscopic retrograde cholangiopancreatography (ERCP). 2.  Biliary sphincterotomy  3. Cholangiogram  4. Biliary stent placement    POSTPROCEDURE DIAGNOSIS:    1. Successful biliary cannulation and a cholangiogram showing area of low grade bile leak in proximity to the level of the cystic duct  2.   Successful biliary sphincterotomy  3. Successful deployment of 7 Montenegrin by 9 cm plastic biliary stent    INSTRUMENTS:   1. Olympus TJF-180VF flexible duodenoscope. 2.  44 Rx preloaded [employer] biliary sphincterotome  3.  44 Rx preloaded [employer] biliary sphincterotome  4.  7 Montenegrin by 9 cm Gareth Melva dual flange duodenal bend biliary stent      MEDICATIONS:   General endotracheal anesthesia  Indomethacin 100 mg MA    EBL: 10<cc    FLUOROSCOPY TIME: 2 minutes     PREPARATION: After the risks, benefits and alternatives of the procedure were discussed, informed consent was obtained. Complications were said to include, but were not limited to: medication allergy, medication reaction, cardiovascular and respiratory problems, bleeding, perforation, infection, pancreatitis, aspiration, and/or missed diagnosis. Following arrival in the endoscopy room, the patient was placed in the supine position and a final time-out was performed in the presence of the nursing staff. The patient was then sedated and intubated, and the examination was started. FINDINGS: The duodenoscope was inserted into the oropharynx under direct visualization and advanced smoothly into the stomach where a small gastric pool was suctioned. A cursory view of the gastric mucosa was normal. The scope was then further advanced through a patent pylorus to the second portion of the duodenum. The major ampulla was visualized and appeared which appeared small in nature with mild amount of free-flowing bile. Access was gained into the common bile duct using a [employer] sphincterotome preloaded with a 0.035\" guidewire. Due to the small size of the ampulla, a 44 Rx scheduled home switch over to 39 Rx for easier cannulation. Pancreatic duct was cannulated, no contrast was injected. This was followed by immediate cannulation of the common bile duct.   A cholangiogram was performed which revealed no obvious distal filling defect. Evidence of bile leak was identified with contrast extravasation in proximity to the level of the cystic duct. The intrahepatics appeared grossly unremarkable. The CBD was not dilated. Following cholangiogram, 7 mm biliary sphincterotomy was performed in oblique fashion and performed successfully. This was followed by exchange of the biliary tome for the biliary stent over the guidewire. A 7 Faroese by 9 cm plastic biliary stent was deployed under fluoroscopic guidance. Successful deployment achieved. Free-flowing bile was observed exiting the distal end of the stent. Final fluoroscopic views of the hemidiaphragm revealed no air. The patient was extubated and returned to the recovery area in good condition having tolerated the procedure well. Impression: Bile leak    1. Successful biliary cannulation and a cholangiogram showing area of low grade bile leak in proximity to the level of the cystic duct  2. Successful biliary sphincterotomy  3. Successful deployment of 7 Faroese by 9 cm plastic biliary stent      RECOMMENDATIONS:   1. Follow up with Dr Sung Oliveira in 4-6 weeks in GI clinic for stent removal evaluation. 2.  Keep n.p.o. for next 4 hours, okay to have ice chips. This should be followed by clear liquid diet tonight if no significant abdominal pain was observed and no concern for post ERCP pancreatitis. Diet should be advanced slowly. 3.  Continue antibiotics as currently ordered. Continue to monitor YOVANY output. 4.  Patient to return to [facility] for ongoing inpatient postoperative care. GI to follow.         Osmani Hunt MD, MPH  Gastroenterology      Electronically signed by Osmani Hunt MD on 7/20/2022 at 5:18 PM

## (undated) DEVICE — RESERVOIR,SUCTION,100CC,SILICONE: Brand: MEDLINE

## (undated) DEVICE — SYSTEM BX CAP BILI RAP EXCHG CAP LOK DEV COMPATIBLE W/ OLY

## (undated) DEVICE — TROCAR: Brand: KII FIOS FIRST ENTRY

## (undated) DEVICE — VESSEL SEALER EXTEND: Brand: ENDOWRIST

## (undated) DEVICE — MHPB BASIC LAP PACK: Brand: MEDLINE INDUSTRIES, INC.

## (undated) DEVICE — SNARE ENDOSCP L240CM LOOP W13MM SHTH DIA2.4MM SM OVL FLX

## (undated) DEVICE — ADHESIVE SKIN CLSR 0.7ML TOP DERMBND ADV

## (undated) DEVICE — STERILE POLYISOPRENE POWDER-FREE SURGICAL GLOVES WITH EMOLLIENT COATING: Brand: PROTEXIS

## (undated) DEVICE — CLIP INT L POLYMER LOK LIG HEM O LOK

## (undated) DEVICE — SEAL

## (undated) DEVICE — SUTURE NONABSORBABLE MONOFILAMENT 3-0 PS-1 18 IN BLK ETHILON 1663H

## (undated) DEVICE — APPLICATOR MEDICATED 26 CC SOLUTION HI LT ORNG CHLORAPREP

## (undated) DEVICE — Device: Brand: DEFENDO VALVE AND CONNECTOR KIT

## (undated) DEVICE — SPHINCTEROTOME: Brand: JAGTOME RX 39

## (undated) DEVICE — SOLUTION IV IRRIG POUR BRL 0.9% SODIUM CHL 2F7124

## (undated) DEVICE — STRAP,POSITIONING,KNEE/BODY,FOAM,4X60": Brand: MEDLINE

## (undated) DEVICE — INSUFFLATION NEEDLE TO ESTABLISH PNEUMOPERITONEUM.: Brand: INSUFFLATION NEEDLE

## (undated) DEVICE — SUCTION IRRIGATOR: Brand: ENDOWRIST

## (undated) DEVICE — PROTECTOR ULN NRV PUR FOAM HK LOOP STRP ANATOMICALLY

## (undated) DEVICE — BLADELESS OBTURATOR: Brand: WECK VISTA

## (undated) DEVICE — SOLUTION IV 1000ML 0.9% SOD CHL PH 5 INJ USP VIAFLX PLAS

## (undated) DEVICE — SPHINCTEROTOME: Brand: DREAMTOME™ RX 44

## (undated) DEVICE — SOLUTION ANTIFOG VIS SYS CLEARIFY LAPSCP

## (undated) DEVICE — GOWN,AURORA,NONRNF,XL,30/CS: Brand: MEDLINE

## (undated) DEVICE — CANNULA SEAL

## (undated) DEVICE — ARM DRAPE

## (undated) DEVICE — BAG SPEC REM 224ML W4XL6IN DIA10MM 1 HND GYN DISP ENDOPCH

## (undated) DEVICE — ELECTRODE PT RET AD L9FT HI MOIST COND ADH HYDRGEL CORDED

## (undated) DEVICE — 40580 - THE PINK PAD - ADVANCED TRENDELENBURG POSITIONING KIT: Brand: 40580 - THE PINK PAD - ADVANCED TRENDELENBURG POSITIONING KIT

## (undated) DEVICE — RETRIEVAL BALLOON CATHETER: Brand: EXTRACTOR™ PRO RX

## (undated) DEVICE — BITEBLOCK 54FR W/ DENT RIM BLOX

## (undated) DEVICE — BLANKET WRM W29.9XL79.1IN UP BODY FORC AIR MISTRAL-AIR

## (undated) DEVICE — PERRYSBURG ENDO PACK: Brand: MEDLINE INDUSTRIES, INC.

## (undated) DEVICE — SOLUTION IV IRRIG 1000ML POUR BTL 2F7114

## (undated) DEVICE — SINGLE USE DISTAL COVER MAJ-2315: Brand: SINGLE USE DISTAL COVER

## (undated) DEVICE — GLOVE SURG SZ 75 CRM LTX FREE POLYISOPRENE POLYMER BEAD ANTI

## (undated) DEVICE — SUTURE V-LOC 180 SZ 3-0 L6IN ABSRB GRN V-20 L26MM 1/2 CIR VLOCL0604

## (undated) DEVICE — SUTURE VCRL + SZ 0 L27IN ABSRB VLT L26MM UR-6 5/8 CIR VCP603H

## (undated) DEVICE — SPONGE DRN W4XL4IN RAYON/POLYESTER 6 PLY NONWOVEN PRECUT

## (undated) DEVICE — DEVICE TRCR 12X9X3IN WHT CLSR DISP OMNICLOSE

## (undated) DEVICE — BLADE CLIPPER GEN PURP NS

## (undated) DEVICE — DRAIN SURG 19FR 100% SIL RADPQ RND CHN FULL FLUT